# Patient Record
Sex: FEMALE | Race: ASIAN | Employment: OTHER | URBAN - METROPOLITAN AREA
[De-identification: names, ages, dates, MRNs, and addresses within clinical notes are randomized per-mention and may not be internally consistent; named-entity substitution may affect disease eponyms.]

---

## 2017-02-06 PROBLEM — M85.89 OSTEOPENIA OF MULTIPLE SITES: Status: ACTIVE | Noted: 2017-02-06

## 2017-02-06 PROBLEM — R25.2 BILATERAL LEG CRAMPS: Status: ACTIVE | Noted: 2017-02-06

## 2018-06-20 PROBLEM — E78.5 DYSLIPIDEMIA: Status: ACTIVE | Noted: 2018-06-20

## 2019-07-02 PROBLEM — D72.819 LEUKOPENIA, UNSPECIFIED TYPE: Status: ACTIVE | Noted: 2019-07-02

## 2019-07-02 PROBLEM — E78.00 HIGH CHOLESTEROL: Status: ACTIVE | Noted: 2019-07-02

## 2019-07-02 PROBLEM — Z78.0 POST-MENOPAUSE: Status: ACTIVE | Noted: 2019-07-02

## 2020-10-08 PROBLEM — Z78.0 POST-MENOPAUSE: Status: RESOLVED | Noted: 2019-07-02 | Resolved: 2020-10-08

## 2024-02-09 ENCOUNTER — HOSPITAL ENCOUNTER (OUTPATIENT)
Dept: GENERAL RADIOLOGY | Facility: HOSPITAL | Age: 74
Discharge: HOME | End: 2024-02-09
Attending: PHYSICIAN ASSISTANT
Payer: MEDICARE

## 2024-02-09 DIAGNOSIS — R13.10 DYSPHAGIA, UNSPECIFIED TYPE: ICD-10-CM

## 2024-02-09 DIAGNOSIS — R47.9 SPEECH DISTURBANCE, UNSPECIFIED TYPE: ICD-10-CM

## 2024-02-09 PROCEDURE — 74230 X-RAY XM SWLNG FUNCJ C+: CPT | Performed by: PHYSICIAN ASSISTANT

## 2024-02-09 PROCEDURE — 92611 MOTION FLUOROSCOPY/SWALLOW: CPT

## 2024-02-09 NOTE — PROGRESS NOTES
ADULT VIDEOFLUOROSCOPIC SWALLOWING STUDY       ADULT VIDEOFLUOROSCOPIC SWALLOWING STUDY:   Referring Physician: Seth      Radiologist: Dr. Wolfe  Diagnosis: dysphagia    Date of Service: 2/9/2024     PATIENT SUMMARY   Chief Complaint: In September the patient's speech suddenly became slow and slurred.  Neuro and ENT work ups, including MRIs, were negative. Laryngoscopy on 1/17/24 was normal. Currently, dysarthria persists and patient developed dysphagia with liquids in that she could not drink consecutively without coughing/choking.  She coughs with food and/or liquid about once per day.  She reported 8 pound weight loss in the past 4 months.  She denies odynophagia and shortness of breath.  She is scheduled for knee replacement surgery in early March.    Current Diet: regular foods and liquids    Problem List  Active Problems:  Active Problems:    * No active hospital problems. *      Past Medical History  Past Medical History:   Diagnosis Date    Arthritis     Combined forms of age-related cataract of left eye 2/29/2016    Elevated blood pressure reading without diagnosis of hypertension     Hemorrhoid     Leukopenia, unspecified type 7/2/2019    MENOPAUSE 12/06    Osteopenia of multiple sites 2/6/2017    OTHER DISEASES     Vitamin D deficiency 3/25/2010        Imaging results: no recent CXR    1/28/24 MRI maxillofacial:  1. No neck mass or cervical lymphadenopathy. The findings have not changed since the prior MRI of   the brain.     2. Suspect small meningioma in the right parasagittal parietal lobe.   12/5/23 MRI brain:  1. No acute infarct or intracranial mass.   2. Minimal supratentorial white matter chronic microvascular ischemic disease.   3. Small FLAIR hyperintense focus in the left lateral midbrain probably represents an additional   focus of chronic microvascular ischemia.   ASSESSMENT   DYSPHAGIA ASSESSMENT  Test completed in conjunction with Radiologist.   Food/Liquid Types Presented: puree,  solid, mildly thick liquids and thin liquids.    Study Position and View:  Patient was seated upright and viewed laterally and A-P.    Pain Assessment: The patient reports pain at a level of 2/10.    Oral phase:  Bilabial seal was intact with no anterior food or liquid loss.  Impaired lingual control and bolus containment resulted in premature spillage with thin and mildly thick liquids. Disordered A-P propulsion observed with the first presentation of puree which the patient indicated was because of the taste.  The patient propelled partial bolus into the pharynx, brought it back up into the oral cavity and then posterior into the pharynx again to achieve a complete swallow.   Mild oral residue with puree and solids was cleared with spontaneous second swallow.     Pharyngeal phase:  The pharyngeal response triggered at the pyriform sinuses for mildly thick and thin liquids due to premature spillage, and at the tongue base to valleculae for puree and solids.  Premature spillage and delayed epiglottic inversion resulted in laryngeal penetration with thin liquids.  Most of the penetration was ejected with the force of the swallow, however, occasionally trace remained in the laryngeal vestibule without reflexive response.  With consecutive drinks the amount and depth of penetration increased.  Penetration was less consistent with use of straw.  No definite aspiration was observed.  Reduced base of tongue retraction and reduced anterior hyoid excursion resulted in mild vallecular retention with liquids and minimal to mild amounts of vallecular retention with puree and solids.  Laryngeal elevation appeared WNL.    Esophageal phase:   Adequate flow of bolus through upper esophagus     Penetration Aspiration Scale: 3/8.  Material entered the airway, remained above the vocal cords and was not ejected from the airway.    Overall Impression: Mild oropharyngeal dysphagia was characterized by premature spillage to the pyriform  sinuses with thin and mildly thick liquids, laryngeal penetration with thin liquids of which trace amounts remained in the laryngeal vestibule without reflexive response, and mild vallecular retention.  No definite aspiration was observed.  Recommend general diet with thin liquids in single drinks and dysphagia therapy.    FCM category and level: Swallowing, 5  PLAN   Potential: Good    Diet Recommendations:  Solids: Regular  Liquids: Thin    Recommended compensatory strategies:   Sit upright  Small sips  Use chin tuck for liquids    Medication Administration:  Take whole in pureed    Further Follow-up:  Dysphagia therapy is recommended.      GOALS (to be met 6 visits)  The patient will tolerate general diet consistency and thin liquids without overt signs or symptoms of aspiration with 100 % accuracy  The patient/family/caregiver will demonstrate understanding and implementation of aspiration precautions and swallow strategies independently  Sit upright  Small sips  Use chin tuck for liquids   Patient will reduce risk of aspiration by completing dysphagia exercises to 90% accuracy  The patient will participate in oral motor assessment     EDUCATION/INSTRUCTION  Reviewed results and recommendations with patient and family.  Written instructions were provided.  Agreement/Understanding verbalized and all questions answered to their apparent satisfaction.      INTERDISCIPLINARY COMMUNICATION  Reviewed results with Radiologist; agreement verbalized.        Patient/Family was advised of these findings, precautions, recommendations and treatment options and has agreed to actively participate in planning and for this course of care.    Thank you for your referral. Please co-sign or sign and return this letter via fax as soon as possible. If you have any questions, please contact me at [phone].    Little Bermudez MA/[facility]-SLP  Speech Language Pathologist  [facility]  [phone]    Electronically  signed by therapist: Little Bermudez, SLP  Physician's certification required:   Yes  I certify the need for these services furnished under this plan of treatment and while under my care.    X___________________________________________________ Date____________________    Certification From: 2/9/2024  To:5/9/2024

## 2024-02-09 NOTE — PATIENT INSTRUCTIONS
VIDEO SWALLOW STUDY    Diet Recommendations:  Solids: Regular  Liquids: Thin    Recommended compensatory strategies:   Sit upright  Small, single sips  Use chin tuck for liquids    Medication Administration:  Take whole in pureed    Further Follow-up:  Swallowing therapy is recommended.  Please call number below to schedule.    Little Bermudez MA/CCC-SLP  Speech Language Pathologist  [facility]  [phone]

## 2024-02-12 ENCOUNTER — ORDER TRANSCRIPTION (OUTPATIENT)
Dept: PHYSICAL THERAPY | Facility: HOSPITAL | Age: 74
End: 2024-02-12

## 2024-02-12 DIAGNOSIS — R13.10 DYSPHAGIA: Primary | ICD-10-CM

## 2024-02-16 ENCOUNTER — TELEPHONE (OUTPATIENT)
Dept: SPEECH THERAPY | Facility: HOSPITAL | Age: 74
End: 2024-02-16

## 2024-02-20 ENCOUNTER — TELEPHONE (OUTPATIENT)
Dept: SPEECH THERAPY | Facility: HOSPITAL | Age: 74
End: 2024-02-20

## 2024-02-21 ENCOUNTER — TELEPHONE (OUTPATIENT)
Dept: SPEECH THERAPY | Facility: HOSPITAL | Age: 74
End: 2024-02-21

## 2024-02-22 ENCOUNTER — OFFICE VISIT (OUTPATIENT)
Dept: SPEECH THERAPY | Facility: HOSPITAL | Age: 74
End: 2024-02-22
Attending: PHYSICIAN ASSISTANT
Payer: MEDICARE

## 2024-02-22 ENCOUNTER — TELEPHONE (OUTPATIENT)
Dept: SPEECH THERAPY | Facility: HOSPITAL | Age: 74
End: 2024-02-22

## 2024-02-22 DIAGNOSIS — R13.12 OROPHARYNGEAL DYSPHAGIA: Primary | ICD-10-CM

## 2024-02-22 PROCEDURE — 92526 ORAL FUNCTION THERAPY: CPT

## 2024-02-22 NOTE — PROGRESS NOTES
Diagnosis: dysphagia (R13.10), speech disturbance (R47.9)  Authorized # of Visits:  6         Precautions: Covid PPE          Subjective: Pt feels her speech has gotten worse since her VFSS on 2/9/24.  The patient was accompanied by her .  This is patient's first therapy session.     2/9/24 VFSS:  Overall Impression: Mild oropharyngeal dysphagia was characterized by premature spillage to the pyriform sinuses with thin and mildly thick liquids, laryngeal penetration with thin liquids of which trace amounts remained in the laryngeal vestibule without reflexive response, and mild vallecular retention.  No definite aspiration was observed.  Recommend general diet with thin liquids in single drinks and dysphagia therapy.  Objective:      Date: 2/22/2024  Tx#: 1/4 Date:   Tx#: 2/4 Date:   Tx#: 3/4 Date:   Tx#: 4/4   EMST       Effortful swallow       BOT/Lingual control & strength       Mendelsohn       Use of strategies       Tolerance of PO diet           Assessment: Reviewed results and recommendations of VFSS.  Reviewed strategies.  Pt reports she has been using chin tuck with thin liquids.     Oral motor/CN exam and motor speech exam completed.  Results revealed possible involvement of CN V, IX, X and XII. Labial strength, ROM were WNL.  Lingual fasciculations were noted.  Lingual strength was mild-moderately reduced bilaterally.  Range of motion was minimally reduced with lateralization and elevation.  Pt was unable to elevate tongue to upper teeth when mouth was open.  Uvula was symmetrical at rest with mild erythema.  Soft palate retraction was impaired in that patient was only able to sustain for max of one second, even with sustained vowel production.  Resonance was significantly hypernasal with frequent nasal emission audible during spontaneous speech.  This along with impaired articulatory precision, reduced the patient's intelligibility to ~80% in conversation.      Motor Speech assessment  (BDAE):  Non-verbal agility: 7/12  Verbal agility: 13/14, however, articulatory breakdown occurred including imprecise consonants          Goals: (to be met 6 visits)  The patient will tolerate general diet consistency and thin liquids without overt signs or symptoms of aspiration with 100 % accuracy  The patient/family/caregiver will demonstrate understanding and implementation of aspiration precautions and swallow strategies independently  Sit upright  Small sips  Use chin tuck for liquids   Patient will reduce risk of aspiration by completing dysphagia exercises to 90% accuracy  The patient will participate in oral motor assessment Goal met.  The patient will improve articulatory precision to improve speech intelligibility to 95%.  The patient will improve lingual control, rate, coordination and strength to improve non-verbal agility to 90% and verbal agility to 100%.    Plan: Continue therapy per specified goals.  HEP x3/day.    Skilled Services: dysphagia therapy, speech therapy    Charges: [id number]     Total Treatment Time: 45 min    Little Bermudez MA/ALBERTO-SLP  Speech Language Pathologist  [facility]  [phone]

## 2024-03-05 ENCOUNTER — OFFICE VISIT (OUTPATIENT)
Dept: SPEECH THERAPY | Facility: HOSPITAL | Age: 74
End: 2024-03-05
Attending: INTERNAL MEDICINE
Payer: MEDICARE

## 2024-03-05 PROCEDURE — 92526 ORAL FUNCTION THERAPY: CPT

## 2024-03-05 NOTE — PROGRESS NOTES
Diagnosis: dysphagia (R13.10), speech disturbance (R47.9)  Authorized # of Visits:  6         Precautions: Covid PPE          Subjective: In the morning the patient feels there is something stuck in her throat.  She tries to cough and blow her nose to get it out; nothing much comes out.  Pt feels her speech has gotten worse since her VFSS on 2/9/24.  The patient was accompanied by her .  She is having knee replacement surgery on Friday, 3/8/24. She has not yet made an appointment with a neurologist.  She was encouraged to do so asap.     2/9/24 VFSS:  Overall Impression: Mild oropharyngeal dysphagia was characterized by premature spillage to the pyriform sinuses with thin and mildly thick liquids, laryngeal penetration with thin liquids of which trace amounts remained in the laryngeal vestibule without reflexive response, and mild vallecular retention.  No definite aspiration was observed.  Recommend general diet with thin liquids in single drinks and dysphagia therapy.  Objective:      Date: 2/22/2024  Tx#: 1/4 Date: 3/5/24  Tx#: 2/4 Date:   Tx#: 3/4 Date:   Tx#: 4/4   EMST  Provided info on ordering      Effortful swallow  Trained and completed x25-30 with water and saliva.     BOT/Lingual control & strength  Significant improvement in lingual elevation since OM assessment last session as patient has been practicing lingual elevation ex at home.    Trained and completed exercises x20 with frequent rest breaks.     Use of strategies  Reviewed.  Pt using chin tuck independently.     Tolerance of PO diet  Aggressive cough x1         Assessment: Reviewed strategies.  Trained lingual control exercises and effortful swallow.  Since patient has been doing lingual elevation exercises given last session, significant improvement noted.  Pt required frequent rest breaks when doing lingual exercises.  Coordination of circular tongue movement around outside of lips was severely impaired.  Pt required cues to protrude  tongue straight out without it pointing downward, but was able to do so with mirror assist.  Provided info on ordering EMST.        Goals: (to be met 6 visits)  The patient will tolerate general diet consistency and thin liquids without overt signs or symptoms of aspiration with 100 % accuracy  The patient/family/caregiver will demonstrate understanding and implementation of aspiration precautions and swallow strategies independently  Sit upright  Small sips  Use chin tuck for liquids   Patient will reduce risk of aspiration by completing dysphagia exercises to 90% accuracy  The patient will participate in oral motor assessment Goal met.  The patient will improve articulatory precision to improve speech intelligibility to 95%.  The patient will improve lingual control, rate, coordination and strength to improve non-verbal agility to 90% and verbal agility to 100%.    Plan: Continue therapy per specified goals.  HEP x3/day.    Skilled Services: dysphagia therapy, speech therapy    Charges: [id number]     Total Treatment Time: 45 min    Little Bermudez MA/ALBERTO-SLP  Speech Language Pathologist  [facility]  [phone]

## 2024-03-19 ENCOUNTER — OFFICE VISIT (OUTPATIENT)
Dept: SPEECH THERAPY | Facility: HOSPITAL | Age: 74
End: 2024-03-19
Attending: INTERNAL MEDICINE
Payer: MEDICARE

## 2024-03-19 PROCEDURE — 92526 ORAL FUNCTION THERAPY: CPT

## 2024-03-19 NOTE — PROGRESS NOTES
Diagnosis: dysphagia (R13.10), speech disturbance (R47.9)  Authorized # of Visits:  6         Precautions: Covid PPE          Subjective: The patient reports no changes with her swallowing and speech.  The patient was accompanied by her .  She had knee replacement surgery on 3/8/24. She has not yet made an appointment with a neurologist.  She was encouraged to do so asap.     2/9/24 VFSS:  Overall Impression: Mild oropharyngeal dysphagia was characterized by premature spillage to the pyriform sinuses with thin and mildly thick liquids, laryngeal penetration with thin liquids of which trace amounts remained in the laryngeal vestibule without reflexive response, and mild vallecular retention.  No definite aspiration was observed.  Recommend general diet with thin liquids in single drinks and dysphagia therapy.  Objective:      Date: 2/22/2024  Tx#: 1/4 Date: 3/5/24  Tx#: 2/4 Date: 3/19/24  Tx#: 3/4 Date:   Tx#: 4/4   EMST  Provided info on ordering  Calibrated starting point at [id number].  Trained and completed protocol.      Effortful swallow  Trained and completed x25-30 with water and saliva. Reviewed.  Pt is completing at home.  Not completed in session due to time constraints.    BOT/Lingual control & strength  Significant improvement in lingual elevation since OM assessment last session as patient has been practicing lingual elevation ex at home.    Trained and completed exercises x20 with frequent rest breaks. Completed 25 reps of each.  Pt needed frequent rest breaks.      Use of strategies  Reviewed.  Pt using chin tuck independently.     Tolerance of PO diet  Aggressive cough x1 No PO presented to day.  Pt reports little coughing at home.        Assessment: Reviewed lingual exercises.  Pt with slow rate and reduced ROM, although ROM is improving.  Pt now able to protrude tongue straight out which is significantly improved from last session. Calibrated EMST and trained and completed protocol.     Goals:  (to be met 6 visits)  The patient will tolerate general diet consistency and thin liquids without overt signs or symptoms of aspiration with 100 % accuracy  The patient/family/caregiver will demonstrate understanding and implementation of aspiration precautions and swallow strategies independently  Sit upright  Small sips  Use chin tuck for liquids   Patient will reduce risk of aspiration by completing dysphagia exercises to 90% accuracy  The patient will participate in oral motor assessment Goal met.  The patient will improve articulatory precision to improve speech intelligibility to 95%.  The patient will improve lingual control, rate, coordination and strength to improve non-verbal agility to 90% and verbal agility to 100%.    Plan: Continue therapy per specified goals.  HEP x3/day.    Skilled Services: dysphagia therapy, speech therapy    Charges: [id number]     Total Treatment Time: 45 min    Little Bermudez MA/ALBERTO-SLP  Speech Language Pathologist  [facility]  [phone]

## 2024-04-05 RX ORDER — MELOXICAM 15 MG/1
15 TABLET ORAL DAILY
COMMUNITY
Start: 2024-03-05

## 2024-04-05 RX ORDER — ASPIRIN 325 MG
325 TABLET ORAL DAILY
COMMUNITY

## 2024-04-10 ENCOUNTER — OFFICE VISIT (OUTPATIENT)
Dept: SPEECH THERAPY | Facility: HOSPITAL | Age: 74
End: 2024-04-10
Attending: PHYSICIAN ASSISTANT
Payer: MEDICARE

## 2024-04-10 PROCEDURE — 92507 TX SP LANG VOICE COMM INDIV: CPT

## 2024-04-10 NOTE — PROGRESS NOTES
Diagnosis: dysphagia (R13.10), speech disturbance (R47.9)  Authorized # of Visits:  6         Precautions: Covid PPE          Subjective:   Pt saw vascular neurologist:  unremarkable MRI brain scan without brainstem or left hemispheric pathology and normal EMG of the arms and bulbar muscles.   Will check for labs for myasthenia gravis. Consider repeat EMG of bulbar muscles in the future. Continue with speech therapy.   EMG:  Conclusion: abnormal study. 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   There is acute right tibialis anterior nerve irritation with axonal loss which may reflect a prior right peroneal nerve injury versus a RIGHT L5   radiculopathy. No evidence for a myopathy or motor neuron disease.      2/9/24 VFSS:  Overall Impression: Mild oropharyngeal dysphagia was characterized by premature spillage to the pyriform sinuses with thin and mildly thick liquids, laryngeal penetration with thin liquids of which trace amounts remained in the laryngeal vestibule without reflexive response, and mild vallecular retention.  No definite aspiration was observed.  Recommend general diet with thin liquids in single drinks and dysphagia therapy.  Objective:      Date: 2/22/2024  Tx#: 1/4 Date: 3/5/24  Tx#: 2/4 Date: 3/19/24  Tx#: 3/4 Date: 4/10/24  Tx#: 4/4   EMST  Provided info on ordering  Calibrated starting point at [id number].  Trained and completed protocol.   Attempted to increase difficulty, however, pat unable.  Pt requires holding cheeks to achieve lip closure.  Frequently air is not coursing through the device due to poor lip closure.   Effortful swallow  Trained and completed x25-30 with water and saliva. Reviewed.  Pt is completing at home.  Not completed in session due to time constraints. Reviewed, but not completed due to time constraints.   BOT/Lingual control & strength  Significant improvement in lingual elevation since OM assessment last session as patient has been practicing  lingual elevation ex at home.    Trained and completed exercises x20 with frequent rest breaks. Completed 25 reps of each.  Pt needed frequent rest breaks.   Peak levels taken via IOPI:    IOPI lingual strength    MIPA: 11 kPa (max strength anterior tongue)    MIPP: 14 kPa (max strength posterior tongue)    Anterior @ 8 kPa (80%) x15 and 7KPa (70%) x35    IOPI labial strength    MIPL: 14 kPa (max strength left lip)    MIPR: 21 kPa (max strength right lip)           Use of strategies  Reviewed.  Pt using chin tuck independently.     Tolerance of PO diet  Aggressive cough x1 No PO presented to day.  Pt reports little coughing at home.        Assessment: Introduced IOPI measures.  Assessed patient's peak levels which were significantly below normal limits.  Anterior tongue treatment was completed.  Pt unable to achive consistent measures at 80%, so dropped down to 70% and patient was much more consistent.  EMST was completed at same level with occasional inability to egeerate air throught the device.     Goals: (to be met 6 visits)  The patient will tolerate general diet consistency and thin liquids without overt signs or symptoms of aspiration with 100 % accuracy  The patient/family/caregiver will demonstrate understanding and implementation of aspiration precautions and swallow strategies independently  Sit upright  Small sips  Use chin tuck for liquids   Patient will reduce risk of aspiration by completing dysphagia exercises to 90% accuracy  The patient will participate in oral motor assessment Goal met.  The patient will improve articulatory precision to improve speech intelligibility to 95%.  The patient will improve lingual control, rate, coordination and strength to improve non-verbal agility to 90% and verbal agility to 100%.    Plan: Continue therapy per specified goals.  HEP x3/day.    Skilled Services: dysphagia therapy, speech therapy    Charges: [id number]     Total Treatment Time: 45 min    Little Bermudez  MA/CCC-SLP  Speech Language Pathologist  [facility]  [phone]

## 2024-04-11 ENCOUNTER — APPOINTMENT (OUTPATIENT)
Dept: SPEECH THERAPY | Facility: HOSPITAL | Age: 74
End: 2024-04-11
Attending: PHYSICIAN ASSISTANT
Payer: MEDICARE

## 2024-04-11 PROCEDURE — 92526 ORAL FUNCTION THERAPY: CPT

## 2024-04-11 NOTE — PROGRESS NOTES
Diagnosis: dysphagia (R13.10), speech disturbance (R47.9)  Authorized # of Visits:  6         Precautions: Covid PPE          Subjective: Pt reports difficulty with balance.  She had knee replacement surgery 3/8/24.  She is currently enrolled in physical therapy at another clinic.    Pt saw vascular neurologist:  unremarkable MRI brain scan without brainstem or left hemispheric pathology and normal EMG of the arms and bulbar muscles.   Will check for labs for myasthenia gravis. Consider repeat EMG of bulbar muscles in the future. Continue with speech therapy.   EMG:  Conclusion: abnormal study. 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   There is acute right tibialis anterior nerve irritation with axonal loss which may reflect a prior right peroneal nerve injury versus a RIGHT L5   radiculopathy. No evidence for a myopathy or motor neuron disease.      2/9/24 VFSS:  Overall Impression: Mild oropharyngeal dysphagia was characterized by premature spillage to the pyriform sinuses with thin and mildly thick liquids, laryngeal penetration with thin liquids of which trace amounts remained in the laryngeal vestibule without reflexive response, and mild vallecular retention.  No definite aspiration was observed.  Recommend general diet with thin liquids in single drinks and dysphagia therapy.  Objective:      Date: 2/22/2024  Tx#: 1/4 Date: 3/5/24  Tx#: 2/4 Date: 3/19/24  Tx#: 3/4 Date: 4/10/24  Tx#: 4/4   EMST  Provided info on ordering  Calibrated starting point at [id number].  Trained and completed protocol.   Attempted to increase difficulty, however, pat unable.  Pt requires holding cheeks to achieve lip closure.  Frequently air is not coursing through the device due to poor lip closure.   Effortful swallow  Trained and completed x25-30 with water and saliva. Reviewed.  Pt is completing at home.  Not completed in session due to time constraints. Reviewed, but not completed due to time constraints.    BOT/Lingual control & strength  Significant improvement in lingual elevation since OM assessment last session as patient has been practicing lingual elevation ex at home.    Trained and completed exercises x20 with frequent rest breaks. Completed 25 reps of each.  Pt needed frequent rest breaks.   Peak levels taken via IOPI:    IOPI lingual strength    MIPA: 11 kPa (max strength anterior tongue)    MIPP: 14 kPa (max strength posterior tongue)    Anterior @ 8 kPa (80%) x15 and 7KPa (70%) x35    IOPI labial strength    MIPL: 14 kPa (max strength left lip)    MIPR: 21 kPa (max strength right lip)           Use of strategies  Reviewed.  Pt using chin tuck independently.     Tolerance of PO diet  Aggressive cough x1 No PO presented to day.  Pt reports little coughing at home.         4/11/24  Tx #: 5   EMST    Effortful swallow X30 with water and saliva   BOT/Lingual control & strength IOPI lingual:  MIPA:  anterior tongue   11 kPa x20 reps  9 kPa x20 reps  10 kPa x10 reps    IOPI labial:  Left: 11 kPa x30, unable to achieve accuracy so reduced to 10 kPa x20 reps    Right: 15 kPa x50         Use of strategies Reviewed.  Pt consistent with use of chin tuck.   Tolerance of PO diet No clinical signs of aspiration         Assessment:  Initiated treatment of posterior tongue and right and left lip strengthening via IOPI.  Pt unable to achieve consistent pressures at 80% level, so dropped down to 70% of maximum.  Reviewed effortful swallow and chin tuck.  Pt able to do consistently.  No clinical signs of aspiration noted.   Speech intelligibility/dysarthria was worse today.  Pt was asked to repeat several times throughout the session.    Goals: (to be met 6 visits)  The patient will tolerate general diet consistency and thin liquids without overt signs or symptoms of aspiration with 100 % accuracy  The patient/family/caregiver will demonstrate understanding and implementation of aspiration precautions and swallow strategies  independently  Sit upright  Small sips  Use chin tuck for liquids   Patient will reduce risk of aspiration by completing dysphagia exercises to 90% accuracy  The patient will participate in oral motor assessment Goal met.  The patient will improve articulatory precision to improve speech intelligibility to 95%.  The patient will improve lingual control, rate, coordination and strength to improve non-verbal agility to 90% and verbal agility to 100%.    Plan: Continue therapy per specified goals.  HEP x3/day.    Skilled Services: dysphagia therapy, speech therapy    Charges: [id number]     Total Treatment Time: 45 min    Little Bermudez MA/ALBERTO-SLP  Speech Language Pathologist  [facility]  [phone]

## 2024-04-16 ENCOUNTER — ANESTHESIA EVENT (OUTPATIENT)
Dept: SURGERY | Facility: HOSPITAL | Age: 74
End: 2024-04-16
Payer: MEDICARE

## 2024-04-16 ENCOUNTER — HOSPITAL ENCOUNTER (OUTPATIENT)
Facility: HOSPITAL | Age: 74
Setting detail: HOSPITAL OUTPATIENT SURGERY
Discharge: HOME | End: 2024-04-16
Attending: OBSTETRICS & GYNECOLOGY | Admitting: OBSTETRICS & GYNECOLOGY
Payer: MEDICARE

## 2024-04-16 ENCOUNTER — ANESTHESIA (OUTPATIENT)
Dept: SURGERY | Facility: HOSPITAL | Age: 74
End: 2024-04-16
Payer: MEDICARE

## 2024-04-16 VITALS
RESPIRATION RATE: 16 BRPM | DIASTOLIC BLOOD PRESSURE: 71 MMHG | BODY MASS INDEX: 26.06 KG/M2 | HEART RATE: 66 BPM | SYSTOLIC BLOOD PRESSURE: 168 MMHG | HEIGHT: 61 IN | WEIGHT: 138 LBS | TEMPERATURE: 97 F | OXYGEN SATURATION: 100 %

## 2024-04-16 PROCEDURE — 0UB98ZX EXCISION OF UTERUS, VIA NATURAL OR ARTIFICIAL OPENING ENDOSCOPIC, DIAGNOSTIC: ICD-10-PCS | Performed by: OBSTETRICS & GYNECOLOGY

## 2024-04-16 PROCEDURE — 88305 TISSUE EXAM BY PATHOLOGIST: CPT | Performed by: OBSTETRICS & GYNECOLOGY

## 2024-04-16 RX ORDER — HYDROCODONE BITARTRATE AND ACETAMINOPHEN 5; 325 MG/1; MG/1
1 TABLET ORAL ONCE AS NEEDED
OUTPATIENT
Start: 2024-04-16 | End: 2024-04-16

## 2024-04-16 RX ORDER — ONDANSETRON 2 MG/ML
4 INJECTION INTRAMUSCULAR; INTRAVENOUS EVERY 6 HOURS PRN
OUTPATIENT
Start: 2024-04-16

## 2024-04-16 RX ORDER — SODIUM CHLORIDE, SODIUM LACTATE, POTASSIUM CHLORIDE, CALCIUM CHLORIDE 600; 310; 30; 20 MG/100ML; MG/100ML; MG/100ML; MG/100ML
INJECTION, SOLUTION INTRAVENOUS CONTINUOUS
OUTPATIENT
Start: 2024-04-16

## 2024-04-16 RX ORDER — ACETAMINOPHEN 500 MG
1000 TABLET ORAL ONCE
Status: DISCONTINUED | OUTPATIENT
Start: 2024-04-16 | End: 2024-04-16 | Stop reason: HOSPADM

## 2024-04-16 RX ORDER — HYDROCODONE BITARTRATE AND ACETAMINOPHEN 5; 325 MG/1; MG/1
2 TABLET ORAL ONCE AS NEEDED
OUTPATIENT
Start: 2024-04-16 | End: 2024-04-16

## 2024-04-16 RX ORDER — SODIUM CHLORIDE, SODIUM LACTATE, POTASSIUM CHLORIDE, CALCIUM CHLORIDE 600; 310; 30; 20 MG/100ML; MG/100ML; MG/100ML; MG/100ML
INJECTION, SOLUTION INTRAVENOUS CONTINUOUS
Status: DISCONTINUED | OUTPATIENT
Start: 2024-04-16 | End: 2024-04-16

## 2024-04-16 RX ORDER — HYDROMORPHONE HYDROCHLORIDE 1 MG/ML
0.4 INJECTION, SOLUTION INTRAMUSCULAR; INTRAVENOUS; SUBCUTANEOUS EVERY 5 MIN PRN
OUTPATIENT
Start: 2024-04-16 | End: 2024-04-17

## 2024-04-16 RX ORDER — HYDROMORPHONE HYDROCHLORIDE 1 MG/ML
0.6 INJECTION, SOLUTION INTRAMUSCULAR; INTRAVENOUS; SUBCUTANEOUS EVERY 5 MIN PRN
OUTPATIENT
Start: 2024-04-16 | End: 2024-04-17

## 2024-04-16 RX ORDER — ACETAMINOPHEN 500 MG
1000 TABLET ORAL ONCE AS NEEDED
OUTPATIENT
Start: 2024-04-16 | End: 2024-04-16

## 2024-04-16 RX ORDER — HYDROMORPHONE HYDROCHLORIDE 1 MG/ML
0.2 INJECTION, SOLUTION INTRAMUSCULAR; INTRAVENOUS; SUBCUTANEOUS EVERY 5 MIN PRN
OUTPATIENT
Start: 2024-04-16 | End: 2024-04-17

## 2024-04-16 RX ORDER — NALOXONE HYDROCHLORIDE 0.4 MG/ML
0.08 INJECTION, SOLUTION INTRAMUSCULAR; INTRAVENOUS; SUBCUTANEOUS AS NEEDED
OUTPATIENT
Start: 2024-04-16 | End: 2024-04-17

## 2024-04-16 RX ORDER — LIDOCAINE HYDROCHLORIDE 10 MG/ML
INJECTION, SOLUTION EPIDURAL; INFILTRATION; INTRACAUDAL; PERINEURAL AS NEEDED
Status: DISCONTINUED | OUTPATIENT
Start: 2024-04-16 | End: 2024-04-16 | Stop reason: SURG

## 2024-04-16 RX ADMIN — LIDOCAINE HYDROCHLORIDE 50 MG: 10 INJECTION, SOLUTION EPIDURAL; INFILTRATION; INTRACAUDAL; PERINEURAL at 16:45:00

## 2024-04-16 RX ADMIN — SODIUM CHLORIDE, SODIUM LACTATE, POTASSIUM CHLORIDE, CALCIUM CHLORIDE: 600; 310; 30; 20 INJECTION, SOLUTION INTRAVENOUS at 16:41:00

## 2024-04-16 NOTE — H&P
[facility]   part of [facility]    History & Physical    Emilia Gaviria Patient Status:  Hospital Outpatient Surgery    11/10/1950 MRN [id number]   Location [facility] SURGERY Attending Elizabeth Alcaraz MD   Hosp Day # 0 PCP Sheeba Pack MD     Date of Admission:  2024    SUBJECTIVE:    Reason for Admission:  Abnormal pelvic ultrasound with endometrial thickening    History of Present Illness:  Patient is a(n) 73 year old female GP/LMP  with abnromal endometrial thickening noted on imaging after knee surgery.  Inadequate office EMB.  Presents for HSC sampling    Medications:  Medications Prior to Admission   Medication Sig Dispense Refill Last Dose    Meloxicam 15 MG Oral Tab Take 1 tablet (15 mg total) by mouth daily.   3/31/2024    Coenzyme Q10 (COQ10 OR) Take 1 capsule by mouth daily.   More than a month    aspirin 325 MG Oral Tab Take 1 tablet (325 mg total) by mouth daily.   3/31/2024    Cholecalciferol (VITAMIN D) 2000 units Oral Cap Take 1 capsule (2,000 Units total) by mouth.   More than a month    magnesium 250 MG Oral Tab Take 1 tablet (250 mg total) by mouth daily.   More than a month    Flaxseed, Linseed, (FLAX SEED OIL OR) Take  by mouth.   More than a month       Allergies:  No Known Allergies     Past Medical History:  Past Medical History:    Arthritis    Combined forms of age-related cataract of left eye    Elevated blood pressure reading without diagnosis of hypertension    Hemorrhoid    Leukopenia, unspecified type    MENOPAUSE    Osteopenia of multiple sites    OTHER DISEASES    PONV (postoperative nausea and vomiting)    Problems with swallowing    Vitamin D deficiency       Past Surgical History:  Past Surgical History:   Procedure Laterality Date    Colonoscopy,diagnostic  2015    1 cm polyp; ASC    Colonoscopy,remv lesn,snare N/A 2015    Procedure: COLONOSCOPY, POSSIBLE BIOPSY, POSSIBLE POLYPECTOMY 38637;  Surgeon: Darrell Dawkins MD;  Location:  [facility]    Lig div&stripping short saphenous vein  2001    Ligation hemorrhoid w/us  2012    Procedure: TRANSANAL HEMORRHOIDAL DEARTERIALIZATION (THD);  Surgeon: Doug Lomax MD;  Location: [facility]    Other surgical history  2015    Cystoscopy - Dr. Randle     Patient documented not to have experienced any of the following events N/A 2015    Procedure: COLONOSCOPY, POSSIBLE BIOPSY, POSSIBLE POLYPECTOMY 46255;  Surgeon: Darrell Dawkins MD;  Location: [facility]    Patient withough preoperative order for iv antibiotic surgical site infection prophylaxis. N/A 2015    Procedure: COLONOSCOPY, POSSIBLE BIOPSY, POSSIBLE POLYPECTOMY 30713;  Surgeon: Darrell Dawkins MD;  Location: [facility]    Repair of rectum,prolapse mucosa  2012    Procedure: EXAM UNDER ANESTHESIA, RECTAL;  Surgeon: Doug Lomax MD;  Location: [facility]    Total knee replacement      Tubal ligation         Past OB History:  OB History    Para Term  AB Living   1 1           SAB IAB Ectopic Multiple Live Births                  # Outcome Date GA Lbr Jamal/2nd Weight Sex Type Anes PTL Lv   1 Para                Past GYN History:   Menopausal.  No HRT.  No bleeding    Social History:  .  Social History     Tobacco Use    Smoking status: Never    Smokeless tobacco: Never   Substance Use Topics    Alcohol use: No     Alcohol/week: 0.0 standard drinks of alcohol        Review of Systems:  normal menses, no abnormal bleeding, pelvic pain or discharge and no breast pain or new or enlarging lumps on self exam    OBJECTIVE:    Temp:  [98.4 °F (36.9 °C)] 98.4 °F (36.9 °C)  Pulse:  [59] 59  Resp:  [158] 158  BP: (164)/(74) 164/74  SpO2:  [97 %] 97 %  No intake or output data in the 24 hours ending 24 1456    Physical Exam:  GENERAL: well developed, well nourished, in no apparent distress  SKIN: no rashes, no suspicious  lesions  HEENT: normal  NECK: supple; no thyroidmegaly, no adenopathy  BREAST: no masses, no nipple discharge, no skin retraction, no axillary adenopathy  LUNGS: clear to auscultation  CARDIOVASCULAR: normal S1, S2, RRR  ABDOMEN: Soft, non distended; non tender, no masses  GYNE/: External Genitalia: Normal                      Vagina: normal                      Uterus: atneverted, mobile, non tender, small                     Cervix: multip, no lesions                     Adnexa: non tender, no masses  EXTREMITIES:  non tender without edema  NEUROLOGIC: intact  PSYCHIATRIC: alert and oriented x 3; affect appropriate      Diagnostics:   DATE OF SERVICE: 02.28.2024   US PELVIS (TRANSABDOMINAL AND TRANSVAGINAL) (CPT=76856/64886)     CLINICAL INDICATION: Disorder of the endometrium, follow-up.     COMPARISON: No relevant prior studies currently available at this institution for direct comparison,   per Epic notes.     TECHNIQUE: Both transabdominal and endovaginal ultrasound examination of the pelvis was performed.     CYCLE: Postmenopausal     FINDINGS:     UTERUS:   The uterus is anteverted and measures 7.9 x 5.1 x 3.2 cm. Total volume is 67.4 mL.   The myometrium is homogeneous.   The endometrial echo complex measures up to 17.0 mm in thickness, which is abnormally thickened with   numerous scattered cystic appearing foci; differential diagnosis includes but is not limited to   endometrial hyperplasia, polyps, and carcinoma. Appropriate workup strongly advised.     The cervix has nabothian cysts..     RIGHT OVARY:   The right ovary is not visualized at this time.     LEFT OVARY:   The left ovary is not visualized at this time.     No significant free fluid in the posterior cul-de-sac.     Data Review:        ASSESSMENT/PLAN:    Patient Active Problem List   Diagnosis    Vitamin D deficiency    Osteopenia of multiple sites    High cholesterol    Leukopenia, unspecified type       We are going to proceed with a D&C  hysteroscopy possible operative hysteroscopy. The risks for surgery including infection, bleeding, and perforation were discussed.  If perforation were to occur, she is aware of the additional risk of injury to surrounding bowel, bladder or blood vessels, and the possible need for laparoscopy, laparotomy, re-operation or transfusion.  She is also aware that some of these injuries may not be identified until a later time and the patient may need to have additional surgery. We discussed the risk of anesthesia as well as the risk for DVT. We discussed the risks for scarring and incompetent cervix. The patient is also aware that insufficient tissue may be obtained. If a resectoscope is performed, she is aware of the risks of fluid imbalance, cerebral edema and seizure. She is also aware that not all of the scheduled procedure may be able to be performed and further surgery may be needed at a later time. The patient notes understanding of these risks and wishes to proceed.    Elizabeth Alcaraz MD  4/16/2024  2:56 PM

## 2024-04-16 NOTE — DISCHARGE INSTRUCTIONS
Discharge Instructions  HYSTEROSCOPY OR DILITATION AND CURETTAGE  DR WILHELM      After surgery you can expect some light vaginal bleeding.  This may last from 1-7 days and is not a concern unless it is heavier than a menstrual period.     Generally, tampons should be avoided but mini-pads or panti-shields may be used until the post-operative spotting has ceased.    If you had an endometrial ablation, you can expect a watery, yellow discharge for 1-3 weeks that will gradually diminish.    There may be some mild cramping in the lower abdomen.  You may take Acetaminophen (Tylenol) or Ibuprofen (Motrin or Advil) if needed.  Usually a prescription pain medication will not be needed.      You should rest the day of surgery and may resume normal activities the next day.  You should not have intercourse until bleeding has stopped.    If you should experience any of the following symptoms, please notify your doctor’s office as soon as possible:  Temperature over 101 degrees, vaginal bleeding heavier than a moderate day of your period, severe abdominal cramping, chest pain, shortness of breath, or any other problem that you feel is significant.  Otherwise follow up with your doctor as indicated.

## 2024-04-16 NOTE — BRIEF OP NOTE
Pre-Operative Diagnosis: ABNORMAL PELVIC ULTRASOUND, THICKENED ENDOMETRIUM     Post-Operative Diagnosis: ABNORMAL PELVIC ULTRASOUND, THICKENED ENDOMETRIUM      Procedure Performed:   HYSTEROSCOPY, SAMPLING POLYPECTOMY    Surgeons and Role:     * Elizabeth Alcaraz MD - Primary    Assistant(s):        Surgical Findings: large endometrial polyp     Specimen: endometrial polypectomy and curettings     Estimated Blood Loss: Blood Output: 1 mL (4/16/2024  5:11 PM)      Dictation Number:       Elizabeth Alcaraz MD  4/16/2024  5:17 PM

## 2024-04-16 NOTE — ANESTHESIA PREPROCEDURE EVALUATION
PRE-OP EVALUATION    Patient Name: Emilia Gaviria    Admit Diagnosis: ABNORMAL PELVIC ULTRASOUND, THICKENED ENDOMETRIUM    Pre-op Diagnosis: ABNORMAL PELVIC ULTRASOUND, THICKENED ENDOMETRIUM    HYSTEROSCOPY, SAMPLING POLYPECTOMY    Anesthesia Procedure: HYSTEROSCOPY, SAMPLING POLYPECTOMY    Surgeons and Role:     * Elizabeth Alcaraz MD - Primary    Pre-op vitals reviewed.  Temp: 98.4 °F (36.9 °C)  Pulse: 59  Resp: 158  BP: 164/74  SpO2: 97 %  Body mass index is 26.07 kg/m².    Current medications reviewed.  Hospital Medications:   lactated ringers infusion   Intravenous Continuous       Outpatient Medications:     Medications Prior to Admission   Medication Sig Dispense Refill Last Dose    Meloxicam 15 MG Oral Tab Take 1 tablet (15 mg total) by mouth daily.   3/31/2024    Coenzyme Q10 (COQ10 OR) Take 1 capsule by mouth daily.   More than a month    aspirin 325 MG Oral Tab Take 1 tablet (325 mg total) by mouth daily.   3/31/2024    Cholecalciferol (VITAMIN D) 2000 units Oral Cap Take 1 capsule (2,000 Units total) by mouth.   More than a month    magnesium 250 MG Oral Tab Take 1 tablet (250 mg total) by mouth daily.   More than a month    Flaxseed, Linseed, (FLAX SEED OIL OR) Take  by mouth.   More than a month       Allergies: Patient has no known allergies.      Anesthesia Evaluation    Patient summary reviewed.    Anesthetic Complications  (+) history of anesthetic complications  History of: PONV       GI/Hepatic/Renal    Negative GI/hepatic/renal ROS.                             Cardiovascular    Negative cardiovascular ROS.                                                   Endo/Other               (+) anemia                   Pulmonary    Negative pulmonary ROS.                       Neuro/Psych    Negative neuro/psych ROS.                                  Past Surgical History:   Procedure Laterality Date    Colonoscopy,diagnostic  04/06/2015    1 cm polyp; ASC    Colonoscopy,thalia major,snare N/A 04/06/2015     Procedure: COLONOSCOPY, POSSIBLE BIOPSY, POSSIBLE POLYPECTOMY 03676;  Surgeon: Darrell Dawkins MD;  Location: [facility]    Lig div&stripping short saphenous vein  01/01/2001    Ligation hemorrhoid w/us  12/11/2012    Procedure: TRANSANAL HEMORRHOIDAL DEARTERIALIZATION (THD);  Surgeon: Doug Lomax MD;  Location: [facility]    Other surgical history  08/19/2015    Cystoscopy - Dr. Randle     Patient documented not to have experienced any of the following events N/A 04/06/2015    Procedure: COLONOSCOPY, POSSIBLE BIOPSY, POSSIBLE POLYPECTOMY 20093;  Surgeon: Darrell Dawkins MD;  Location: [facility]    Patient withough preoperative order for iv antibiotic surgical site infection prophylaxis. N/A 04/06/2015    Procedure: COLONOSCOPY, POSSIBLE BIOPSY, POSSIBLE POLYPECTOMY 16638;  Surgeon: Darrell Dawkins MD;  Location: [facility]    Repair of rectum,prolapse mucosa  12/11/2012    Procedure: EXAM UNDER ANESTHESIA, RECTAL;  Surgeon: Doug Lomax MD;  Location: [facility]    Total knee replacement      Tubal ligation       Social History     Socioeconomic History    Marital status:      Spouse name:  5/2010   Occupational History    Occupation: retired [employer]   Tobacco Use    Smoking status: Never    Smokeless tobacco: Never   Vaping Use    Vaping status: Never Used   Substance and Sexual Activity    Alcohol use: No     Alcohol/week: 0.0 standard drinks of alcohol    Drug use: Never    Sexual activity: Yes     Partners: Male   Other Topics Concern    Exercise Yes    Seat Belt Yes     History   Drug Use Unknown     Available pre-op labs reviewed.                ASA: 2   Plan: MAC  NPO status verified and     Post-procedure pain management plan discussed with surgeon and patient.    Comment: MAC discussed in detail, as well as, possible conversion to GETA.  Risk of sore throat, cough, dental trauma, PONV discussed.  All  questions answered    Plan/risks discussed with: patient                Present on Admission:  **None**

## 2024-04-16 NOTE — ANESTHESIA POSTPROCEDURE EVALUATION
[facility]    Emilia Gaviria Patient Status:  Hospital Outpatient Surgery   Age/Gender 73 year old female MRN [id number]   Location [facility] SURGERY Attending Elizabeth Alcaraz MD   Hosp Day # 0 PCP Sheeba Pack MD       Anesthesia Post-op Note    HYSTEROSCOPY, SAMPLING POLYPECTOMY    Procedure Summary       Date: 04/16/24 Room / Location:  MAIN OR 08 / [facility] MAIN OR    Anesthesia Start: 1641 Anesthesia Stop: 1728    Procedure: HYSTEROSCOPY, SAMPLING POLYPECTOMY (Vagina ) Diagnosis: (ABNORMAL PELVIC ULTRASOUND, THICKENED ENDOMETRIUM)    Surgeons: Elizabeth Alcaraz MD Anesthesiologist: Royce Day MD    Anesthesia Type: MAC ASA Status: 2            Anesthesia Type: MAC    Vitals Value Taken Time   /42 04/16/24 1728   Temp 99 04/16/24 1728   Pulse 85 04/16/24 1728   Resp 19 04/16/24 1728   SpO2 99 04/16/24 1728       Patient Location: [facility] Surgery    Anesthesia Type: MAC    Airway Patency: patent    Postop Pain Control: adequate    Mental Status: preanesthetic baseline    Nausea/Vomiting: none    Cardiopulmonary/Hydration status: stable euvolemic    Complications: no apparent anesthesia related complications    Postop vital signs: stable    Dental Exam: Unchanged from Preop    Patient to be discharged home when criteria met.

## 2024-04-16 NOTE — OPERATIVE REPORT
[facility]    PATIENT'S NAME: DENIS RODRIGUEZ   ATTENDING PHYSICIAN: Elizabeth Alcaraz M.D.   OPERATING PHYSICIAN: Elizabeth Alcaraz M.D.   PATIENT ACCOUNT#:   [id number]    LOCATION:  [facility] 5 EDWP 10  MEDICAL RECORD #:   [id number]       YOB: 1950  ADMISSION DATE:       04/16/2024      OPERATION DATE:  04/16/2024    OPERATIVE REPORT      PREOPERATIVE DIAGNOSIS:  Abnormal endometrium on pelvic ultrasound.  POSTOPERATIVE DIAGNOSIS:  Abnormal endometrium on pelvic ultrasound, with endometrial polyps.    PROCEDURE:      ASSISTANT:  None.    ANESTHESIA:  MAC.    FLUIDS:  Crystalloid.      URINE OUTPUT:  Not measured.      ESTIMATED BLOOD LOSS:  1 mL.      OPERATIVE TECHNIQUE:  The patient was taken to the operating room with an IV running.  She was administered anesthesia without incident.  Once her anesthesia was adequate, she was prepped and draped in the normal sterile fashion.  Once her anesthesia was adequate, using a vaginoscopy technique, the external cervical os was visualized.  The scope was introduced into the external cervical os and introduced into the endometrial cavity under direct visualization.  There was a large polyp filling the endometrial cavity with additional polypoid changes along the endometrium anteriorly, especially on the right.  Using the soft tissue polyp blade, the polyp was resected and the polypoid changes anteriorly were resected.  This may have been a an old fibroid.  Once the surface polypoid tissue was resected, the interior appeared more dense.  Once the procedure was completed, the scope was removed from the uterus.  The patient was awakened from anesthesia and brought to the recovery room in excellent condition.     Dictated By Elizabeth Alcaraz M.D.  d: 04/16/2024 17:16:54  t: 04/16/2024 18:40:17  [facility] [id number]  AS/

## 2024-04-18 ENCOUNTER — OFFICE VISIT (OUTPATIENT)
Dept: SPEECH THERAPY | Facility: HOSPITAL | Age: 74
End: 2024-04-18
Attending: PHYSICIAN ASSISTANT
Payer: MEDICARE

## 2024-04-18 PROCEDURE — 92526 ORAL FUNCTION THERAPY: CPT

## 2024-04-18 NOTE — PROGRESS NOTES
Diagnosis: dysphagia (R13.10), speech disturbance (R47.9)  Authorized # of Visits:  6         Precautions: Covid PPE          Subjective: All blood work and MRI were negative.  Auto immune blood work was normal.      Vascular neurologist's impression:  unremarkable MRI brain scan without brainstem or left hemispheric pathology and normal EMG of the arms and bulbar muscles. Will check for labs for myasthenia gravis. Consider repeat EMG of bulbar muscles in the future. Continue with speech therapy.   EMG:  Conclusion: abnormal study. 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 There is acute right tibialis anterior nerve irritation with axonal loss which may reflect a prior right peroneal nerve injury versus a RIGHT L5 radiculopathy. No evidence for a myopathy or motor neuron disease.      2/9/24 VFSS:  Overall Impression: Mild oropharyngeal dysphagia was characterized by premature spillage to the pyriform sinuses with thin and mildly thick liquids, laryngeal penetration with thin liquids of which trace amounts remained in the laryngeal vestibule without reflexive response, and mild vallecular retention.  No definite aspiration was observed.  Recommend general diet with thin liquids in single drinks and dysphagia therapy.  Objective:      Date: 2/22/2024  Tx#: 1/4 Date: 3/5/24  Tx#: 2/4 Date: 3/19/24  Tx#: 3/4 Date: 4/10/24  Tx#: 4/4   EMST  Provided info on ordering  Calibrated starting point at [id number].  Trained and completed protocol.   Attempted to increase difficulty, however, pat unable.  Pt requires holding cheeks to achieve lip closure.  Frequently air is not coursing through the device due to poor lip closure.   Effortful swallow  Trained and completed x25-30 with water and saliva. Reviewed.  Pt is completing at home.  Not completed in session due to time constraints. Reviewed, but not completed due to time constraints.   BOT/Lingual control & strength  Significant improvement in lingual  elevation since OM assessment last session as patient has been practicing lingual elevation ex at home.    Trained and completed exercises x20 with frequent rest breaks. Completed 25 reps of each.  Pt needed frequent rest breaks.   Peak levels taken via IOPI:    IOPI lingual strength    MIPA: 11 kPa (max strength anterior tongue)    MIPP: 14 kPa (max strength posterior tongue)    posterior @ 8 kPa (80%) x15 and 7KPa (70%) x35    IOPI labial strength    MIPL: 14 kPa (max strength left lip)    MIPR: 21 kPa (max strength right lip)           Use of strategies  Reviewed.  Pt using chin tuck independently.     Tolerance of PO diet  Aggressive cough x1 No PO presented to day.  Pt reports little coughing at home.         4/11/24  Tx #: 5 4/18/24  6   EMST  Increased to 37.5 cmH2O  Completed protocol at new level.  Pt nose using nose clip and hand on cheeks to assist labial closure.  Pt still with some air loss around white mouth piece.  Not able to course air through with every blow, ~90%   Effortful swallow X30 with water and saliva Pt is doing at home.  Reviewed.   BOT/Lingual control & strength IOPI lingual:  MIPA:  anterior tongue   11 kPa x20 reps  9 kPa x20 reps  10 kPa x10 reps    IOPI labial:  Left: 11 kPa x30, unable to achieve accuracy so reduced to 10 kPa x20 reps    Right: 15 kPa x50       OPI lingual:  anterior tongue   9 kPa x25 reps  10 kPa x25 reps    Posterior tongue   8 kPa (80%) 2 sets x25     IOPI labial:  Left: 11 kPa x25    Right: 15 kPa x25    Less reps due to time constraints.    Improved lingual elevation   Use of strategies Reviewed.  Pt consistent with use of chin tuck.    Tolerance of PO diet No clinical signs of aspiration          Assessment:  Improvement noted with [id number] with increase in difficulty indicating an increase expiratory muscle strength.  Improvement also noted with IOPI labial and lingual strength as indicated above.  Speech remains mod-severely dysarthric with frequent requests  for repetition required in order to be understood.  Face to face communication is essential for communication.   Reviewed effortful swallow and chin tuck.  Pt able to do consistently.  No PO presented today.      Goals: (to be met 6 visits)  The patient will tolerate general diet consistency and thin liquids without overt signs or symptoms of aspiration with 100 % accuracy  The patient/family/caregiver will demonstrate understanding and implementation of aspiration precautions and swallow strategies independently  Sit upright  Small sips  Use chin tuck for liquids   Patient will reduce risk of aspiration by completing dysphagia exercises to 90% accuracy  The patient will participate in oral motor assessment Goal met.  The patient will improve articulatory precision to improve speech intelligibility to 95%.  The patient will improve lingual control, rate, coordination and strength to improve non-verbal agility to 90% and verbal agility to 100%.    Plan: Continue therapy per specified goals.  HEP x3/day.    Skilled Services: dysphagia therapy, speech therapy    Charges: [id number]     Total Treatment Time: 45 min    Little Bermudez MA/ALBERTO-SLP  Speech Language Pathologist  Replaced by [facility]  [phone]

## 2024-04-25 ENCOUNTER — OFFICE VISIT (OUTPATIENT)
Dept: SPEECH THERAPY | Facility: HOSPITAL | Age: 74
End: 2024-04-25
Attending: PHYSICIAN ASSISTANT
Payer: MEDICARE

## 2024-04-25 PROCEDURE — 92526 ORAL FUNCTION THERAPY: CPT

## 2024-04-25 NOTE — PROGRESS NOTES
Diagnosis: dysphagia (R13.10), speech disturbance (R47.9)  Authorized # of Visits:  6         Precautions: Covid PPE          Subjective: Pt reports her speech is getting worse.  Even close friends and family have difficulty understanding her.  Pt uses a hard swallow every time she swallows food or liquid.  She is coughing less than 1-2 times per day.  Pt reports she has an appointment with a new neurologist in June.  She reported weakness in her right foot and balance issues.  She is seeing an ortho PT since her knee surgery.  Pt may benefit from neuro PT.    Vascular neurologist's impression:  unremarkable MRI brain scan without brainstem or left hemispheric pathology and normal EMG of the arms and bulbar muscles. Will check for labs for myasthenia gravis. Consider repeat EMG of bulbar muscles in the future. Continue with speech therapy.   EMG:  Conclusion: abnormal study. 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 There is acute right tibialis anterior nerve irritation with axonal loss which may reflect a prior right peroneal nerve injury versus a RIGHT L5 radiculopathy. No evidence for a myopathy or motor neuron disease.      2/9/24 VFSS:  Overall Impression: Mild oropharyngeal dysphagia was characterized by premature spillage to the pyriform sinuses with thin and mildly thick liquids, laryngeal penetration with thin liquids of which trace amounts remained in the laryngeal vestibule without reflexive response, and mild vallecular retention.  No definite aspiration was observed.  Recommend general diet with thin liquids in single drinks and dysphagia therapy.  Objective:      Date: 2/22/2024  Tx#: 1/4 Date: 3/5/24  Tx#: 2/4 Date: 3/19/24  Tx#: 3/4 Date: 4/10/24  Tx#: 4/4   EMST  Provided info on ordering  Calibrated starting point at [id number].  Trained and completed protocol.   Attempted to increase difficulty, however, pat unable.  Pt requires holding cheeks to achieve lip closure.   Frequently air is not coursing through the device due to poor lip closure.   Effortful swallow  Trained and completed x25-30 with water and saliva. Reviewed.  Pt is completing at home.  Not completed in session due to time constraints. Reviewed, but not completed due to time constraints.   BOT/Lingual control & strength  Significant improvement in lingual elevation since OM assessment last session as patient has been practicing lingual elevation ex at home.    Trained and completed exercises x20 with frequent rest breaks. Completed 25 reps of each.  Pt needed frequent rest breaks.   Peak levels taken via IOPI:    IOPI lingual strength    MIPA: 11 kPa (max strength anterior tongue)    MIPP: 14 kPa (max strength posterior tongue)    posterior @ 8 kPa (80%) x15 and 7KPa (70%) x35    IOPI labial strength    MIPL: 14 kPa (max strength left lip)    MIPR: 21 kPa (max strength right lip)           Use of strategies  Reviewed.  Pt using chin tuck independently.     Tolerance of PO diet  Aggressive cough x1 No PO presented to day.  Pt reports little coughing at home.         4/11/24  Tx #: 5 4/18/24  6 4/25/24  7   EMST  Increased to 37.5 cmH2O  Completed protocol at new level.  Pt nose using nose clip and hand on cheeks to assist labial closure.  Pt still with some air loss around white mouth piece.  Not able to course air through with every blow, ~90% Increased to [id number].  Pt able to complete entire protocol at this more difficult setting.  Less air leakage around device.     Effortful swallow X30 with water and saliva Pt is doing at home.  Reviewed. Effortful swallows completed between sets of EMST.   BOT/Lingual control & strength IOPI lingual:  MIPA:  anterior tongue   11 kPa x20 reps  9 kPa x20 reps  10 kPa x10 reps    IOPI labial:  Left: 11 kPa x30, unable to achieve accuracy so reduced to 10 kPa x20 reps    Right: 15 kPa x50       IOPI lingual:  anterior tongue   9 kPa x25 reps  10 kPa x25 reps    Posterior tongue    8 kPa (80%) 2 sets x25     IOPI labial:  Left: 11 kPa x25    Right: 15 kPa x25    Less reps due to time constraints.    Improved lingual elevation IOPI lingual:  Anterior: 10 kPa x25  Posterior: 11 kPa x25        IOPI labial:  Left: 12 kPa x25  Right: 16 kPa x25     Use of strategies Reviewed.  Pt consistent with use of chin tuck.     Tolerance of PO diet No clinical signs of aspiration  No PO given this session.   Speech strategies   Briefly discussed.   Intelligibility   Unknown context blinded 25%  Unknown context unblinded 67%         Assessment:  Fasciculations of the tongue were again appreciated.  Articulatory accuracy and intelligibility continues to decline.  Speech remains mod-severely dysarthric with frequent requests for repetition required in order to be understood.  Face to face communication is essential for communication which improved from 25% blinded to 67% when looking at the patient.  Both non-verbal and verbal agility have declined since initial speech evaluation on 2/22/24.  However, improvement was noted with expiratory muscle strength as evidenced by increase in [id number] level.  Improvement also noted with labial and lingual strength as pt was able to increase targets with all IOPI lingual and labial measures.  Reviewed effortful swallow and chin tuck.  Pt able to do consistently.  No PO presented today.  Left message with Dr. Pack's office regarding progressive dysphagia and dysarthria and foot weakness and balance disturbance.         2/22/24 4/25/24  Non-verbal agility:  7/12 6/12  Verbal agility:   13/14 9/14        Goals: (to be met 6 visits)  The patient will tolerate general diet consistency and thin liquids without overt signs or symptoms of aspiration with 100 % accuracy  The patient/family/caregiver will demonstrate understanding and implementation of aspiration precautions and swallow strategies independently  Sit upright  Small sips  Use chin tuck for liquids   Patient will  reduce risk of aspiration by completing dysphagia exercises to 90% accuracy  The patient will participate in oral motor assessment Goal met.  The patient will improve articulatory precision to improve speech intelligibility to 95%.  The patient will improve lingual control, rate, coordination and strength to improve non-verbal agility to 90% and verbal agility to 100%.    Plan: Continue therapy per specified goals.  HEP x3/day.    Skilled Services: dysphagia therapy, speech therapy    Charges: [id number]     Total Treatment Time: 45 min    Little Bermudez MA/ALBERTO-SLP  Speech Language Pathologist  [facility]  [phone]

## 2024-05-03 ENCOUNTER — OFFICE VISIT (OUTPATIENT)
Dept: SPEECH THERAPY | Facility: HOSPITAL | Age: 74
End: 2024-05-03
Attending: PHYSICIAN ASSISTANT
Payer: MEDICARE

## 2024-05-03 PROCEDURE — 92526 ORAL FUNCTION THERAPY: CPT

## 2024-05-03 NOTE — PROGRESS NOTES
Diagnosis: dysphagia (R13.10), speech disturbance (R47.9)  Authorized # of Visits:  6         Precautions: Covid PPE          Subjective: After last session, the patient's PCP was contacted and a message was left with her office regarding patient's  \"progressive dysphagia and dysarthria and foot weakness and balance disturbance.\"  Pt reported that she was contacted and asked if she was in the hospital.  From encounter notes it is apparent that the phone call that I (this SLP) placed with PCP was misunderstood.  They documented that a nurse from the hospital and not a SLP from the clinic called.  They then incorrectly deduced that the patient had been admitted to the hospital.  The patient was contacted and straightened out the miscommunication.     She feels her breathing is a little worse.  She has not been using the [id number], because it is too difficult.  She is still coughing when drinking and had one very aggressive and prolonged episode with water.  She feels like her saliva is more sticky.    Pt reports she has an appointment with a new neurologist in June.  She reported weakness in her right foot and balance issues.  She is seeing an ortho PT since her knee surgery.  Pt may benefit from neuro PT.    Vascular neurologist's impression:  unremarkable MRI brain scan without brainstem or left hemispheric pathology and normal EMG of the arms and bulbar muscles. Will check for labs for myasthenia gravis. Consider repeat EMG of bulbar muscles in the future. Continue with speech therapy.   EMG:  Conclusion: abnormal study. 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 There is acute right tibialis anterior nerve irritation with axonal loss which may reflect a prior right peroneal nerve injury versus a RIGHT L5 radiculopathy. No evidence for a myopathy or motor neuron disease.      2/9/24 VFSS:  Overall Impression: Mild oropharyngeal dysphagia was characterized by premature spillage to the pyriform  sinuses with thin and mildly thick liquids, laryngeal penetration with thin liquids of which trace amounts remained in the laryngeal vestibule without reflexive response, and mild vallecular retention.  No definite aspiration was observed.  Recommend general diet with thin liquids in single drinks and dysphagia therapy.  Objective:      Date: 2/22/2024  Tx#: 1/4 Date: 3/5/24  Tx#: 2/4 Date: 3/19/24  Tx#: 3/4 Date: 4/10/24  Tx#: 4/4   EMST  Provided info on ordering  Calibrated starting point at [id number].  Trained and completed protocol.   Attempted to increase difficulty, however, pat unable.  Pt requires holding cheeks to achieve lip closure.  Frequently air is not coursing through the device due to poor lip closure.   Effortful swallow  Trained and completed x25-30 with water and saliva. Reviewed.  Pt is completing at home.  Not completed in session due to time constraints. Reviewed, but not completed due to time constraints.   BOT/Lingual control & strength  Significant improvement in lingual elevation since OM assessment last session as patient has been practicing lingual elevation ex at home.    Trained and completed exercises x20 with frequent rest breaks. Completed 25 reps of each.  Pt needed frequent rest breaks.   Peak levels taken via IOPI:    IOPI lingual strength    MIPA: 11 kPa (max strength anterior tongue)    MIPP: 14 kPa (max strength posterior tongue)    posterior @ 8 kPa (80%) x15 and 7KPa (70%) x35    IOPI labial strength    MIPL: 14 kPa (max strength left lip)    MIPR: 21 kPa (max strength right lip)           Use of strategies  Reviewed.  Pt using chin tuck independently.     Tolerance of PO diet  Aggressive cough x1 No PO presented to day.  Pt reports little coughing at home.         4/11/24  Tx #: 5 4/18/24  6 4/25/24  7 5/3/24  8   EMST  Increased to 37.5 cmH2O  Completed protocol at new level.  Pt nose using nose clip and hand on cheeks to assist labial closure.  Pt still with some air loss  around white mouth piece.  Not able to course air through with every blow, ~90% Increased to [id number].  Pt able to complete entire protocol at this more difficult setting.  Less air leakage around device.   Pt not able to advance difficulty level.  Remained at [id number], but able to complete entire protocol without as much leakage from around the mouth piece.  The patient still benefits from hand against her cheeks to aid in labial closure around the mouthpiece.     Effortful swallow X30 with water and saliva Pt is doing at home.  Reviewed. Effortful swallows completed between sets of EMST. X30 during session  with water and saliva.    Every  time pt drinks at home she uses effortful swallow.   BOT/Lingual control & strength IOPI lingual:  MIPA:  anterior tongue   11 kPa x20 reps  9 kPa x20 reps  10 kPa x10 reps    IOPI labial:  Left: 11 kPa x30, unable to achieve accuracy so reduced to 10 kPa x20 reps    Right: 15 kPa x50       IOPI lingual:  anterior tongue   9 kPa x25 reps  10 kPa x25 reps    Posterior tongue   8 kPa (80%) 2 sets x25     IOPI labial:  Left: 11 kPa x25    Right: 15 kPa x25    Less reps due to time constraints.    Improved lingual elevation IOPI lingual:  Anterior: 10 kPa x25  Posterior: 11 kPa x25        IOPI labial:  Left: 12 kPa x25  Right: 16 kPa x25   IOPI lingual  Anterior: 10kPa x35  Posterior: 11kPa x35      IOPI  Left: 13 kPa x25  Right: 17kPa x25   Use of strategies Reviewed.  Pt consistent with use of chin tuck.   Pt is independent with chin tuck and small sips.   Tolerance of PO diet No clinical signs of aspiration  No PO given this session. No clinical signs of aspiration.   Speech strategies   Briefly discussed.    Intelligibility   Unknown context blinded 25%  Unknown context unblinded 67%          Assessment:  Articulatory accuracy and intelligibility continues to be significantly impaired.  Speech remains mod-severely dysarthric with frequent requests for repetition required in order  to be understood.  Pt has not been using EMST at home because it was too difficult.  The device was inadvertently turned so difficulty level was increased beyond patient's ability.  Device was returned to previous level and during the session she was able to complete the protocol at same level as last session.  Improvement also noted with labial and lingual strength as pt was able to increase targets with IOPI labial measures.  She was not able to increase lingual target pressure, but increased reps.  Encouraged consistent EMST use, effortful swallows and IOPI bulb exercises for HEP.  As patient is nearing the end of Medicare coverage, it is recommended skipping next week's session and adding it on two week after her next session.  Therefore her next follow up session will be in two weeks.         2/22/24 4/25/24  Non-verbal agility:  7/12 6/12  Verbal agility:   13/14 9/14        Goals: (to be met 6 visits)  The patient will tolerate general diet consistency and thin liquids without overt signs or symptoms of aspiration with 100 % accuracy  The patient/family/caregiver will demonstrate understanding and implementation of aspiration precautions and swallow strategies independently  Sit upright  Small sips  Use chin tuck for liquids   Patient will reduce risk of aspiration by completing dysphagia exercises to 90% accuracy  The patient will participate in oral motor assessment Goal met.  The patient will improve articulatory precision to improve speech intelligibility to 95%.  The patient will improve lingual control, rate, coordination and strength to improve non-verbal agility to 90% and verbal agility to 100%.    Plan: Continue therapy per specified goals.  HEP x3/day.    Skilled Services: dysphagia therapy, speech therapy    Charges: [id number]     Total Treatment Time: 45 min    Little Bermudez MA/[facility]-SLP  Speech Language Pathologist  [facility]  [phone]

## 2024-05-09 ENCOUNTER — APPOINTMENT (OUTPATIENT)
Dept: SPEECH THERAPY | Facility: HOSPITAL | Age: 74
End: 2024-05-09
Attending: PHYSICIAN ASSISTANT
Payer: MEDICARE

## 2024-05-16 ENCOUNTER — OFFICE VISIT (OUTPATIENT)
Dept: SPEECH THERAPY | Facility: HOSPITAL | Age: 74
End: 2024-05-16
Attending: PHYSICIAN ASSISTANT
Payer: MEDICARE

## 2024-05-16 PROCEDURE — 92526 ORAL FUNCTION THERAPY: CPT

## 2024-05-16 NOTE — PROGRESS NOTES
Diagnosis: dysphagia (R13.10), speech disturbance (R47.9)  Authorized # of Visits:  6         Precautions: Covid PPE          Subjective: Eating is easier.   She has been using the [id number] this week.  Pt has an appointment with a new neurologist in June.  She reported weakness in her right foot and balance issues.  She has two more sessions with ortho PT since her knee surgery.  Pt may benefit from neuro PT.    Vascular neurologist's impression:  unremarkable MRI brain scan without brainstem or left hemispheric pathology and normal EMG of the arms and bulbar muscles. Will check for labs for myasthenia gravis. Consider repeat EMG of bulbar muscles in the future. Continue with speech therapy.   EMG:  Conclusion: abnormal study. 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 There is acute right tibialis anterior nerve irritation with axonal loss which may reflect a prior right peroneal nerve injury versus a RIGHT L5 radiculopathy. No evidence for a myopathy or motor neuron disease.      2/9/24 VFSS:  Overall Impression: Mild oropharyngeal dysphagia was characterized by premature spillage to the pyriform sinuses with thin and mildly thick liquids, laryngeal penetration with thin liquids of which trace amounts remained in the laryngeal vestibule without reflexive response, and mild vallecular retention.  No definite aspiration was observed.  Recommend general diet with thin liquids in single drinks and dysphagia therapy.  Objective:      Date: 2/22/2024  Tx#: 1/4 Date: 3/5/24  Tx#: 2/4 Date: 3/19/24  Tx#: 3/4 Date: 4/10/24  Tx#: 4/4   EMST  Provided info on ordering  Calibrated starting point at [id number].  Trained and completed protocol.   Attempted to increase difficulty, however, pat unable.  Pt requires holding cheeks to achieve lip closure.  Frequently air is not coursing through the device due to poor lip closure.   Effortful swallow  Trained and completed x25-30 with water and saliva. Reviewed.   Pt is completing at home.  Not completed in session due to time constraints. Reviewed, but not completed due to time constraints.   BOT/Lingual control & strength  Significant improvement in lingual elevation since OM assessment last session as patient has been practicing lingual elevation ex at home.    Trained and completed exercises x20 with frequent rest breaks. Completed 25 reps of each.  Pt needed frequent rest breaks.   Peak levels taken via IOPI:    IOPI lingual strength    MIPA: 11 kPa (max strength anterior tongue)    MIPP: 14 kPa (max strength posterior tongue)    posterior @ 8 kPa (80%) x15 and 7KPa (70%) x35    IOPI labial strength    MIPL: 14 kPa (max strength left lip)    MIPR: 21 kPa (max strength right lip)           Use of strategies  Reviewed.  Pt using chin tuck independently.     Tolerance of PO diet  Aggressive cough x1 No PO presented to day.  Pt reports little coughing at home.         4/11/24  Tx #: 5 4/18/24  6 4/25/24  7 5/3/24  8 5/16/24  9   EMST  Increased to 37.5 cmH2O  Completed protocol at new level.  Pt nose using nose clip and hand on cheeks to assist labial closure.  Pt still with some air loss around white mouth piece.  Not able to course air through with every blow, ~90% Increased to [id number].  Pt able to complete entire protocol at this more difficult setting.  Less air leakage around device.   Pt not able to advance difficulty level.  Remained at [id number], but able to complete entire protocol without as much leakage from around the mouth piece.  The patient still benefits from hand against her cheeks to aid in labial closure around the mouthpiece.   Pt moved it to a more difficult level this week.  Re-calibrated.  Pt able to go up to 108.75 cmH2O, a significant increase.  Pt still benefits from tactile pressure on cheeks to aid in lip seal.    Effortful swallow X30 with water and saliva Pt is doing at home.  Reviewed. Effortful swallows completed between sets of EMST. X30 during  session  with water and saliva.    Every  time pt drinks at home she uses effortful swallow. Pt is doing \"a lot\" at home.    Not completed during session due to time constraints.   BOT/Lingual control & strength IOPI lingual:  MIPA:  anterior tongue   11 kPa x20 reps  9 kPa x20 reps  10 kPa x10 reps    IOPI labial:  Left: 11 kPa x30, unable to achieve accuracy so reduced to 10 kPa x20 reps    Right: 15 kPa x50       IOPI lingual:  anterior tongue   9 kPa x25 reps  10 kPa x25 reps    Posterior tongue   8 kPa (80%) 2 sets x25     IOPI labial:  Left: 11 kPa x25    Right: 15 kPa x25    Less reps due to time constraints.    Improved lingual elevation IOPI lingual:  Anterior: 10 kPa x25  Posterior: 11 kPa x25        IOPI labial:  Left: 12 kPa x25  Right: 16 kPa x25   IOPI lingual  Anterior: 10kPa x35  Posterior: 11kPa x35      IOPI  Left: 13 kPa x25  Right: 17kPa x25 IOPI lingual  Anterior: 11kPa x50  Anterior: 12kPa x10  Posterior: 12kPa x50    IOPI labial  Left: 13 kPa x25  Right: 17kPa x25   Use of strategies Reviewed.  Pt consistent with use of chin tuck.   Pt is independent with chin tuck and small sips. Pt is independent with swallowing strategies.     Tolerance of PO diet No clinical signs of aspiration  No PO given this session. No clinical signs of aspiration. No PO presented today.   Speech strategies   Briefly discussed.  Cues required for speech strategies.   Intelligibility   Unknown context blinded 25%  Unknown context unblinded 67%           Assessment:  Lingual strength continues to improve with each session.  Pt was able to increase targets and number of reps with IOPI lingual measures.  Labial measures remained stable.  Expiratory muscle strength is improving as evidenced by increase in difficulty level of EMST. 5 sets of 5 were completed. Discussed speech strategies including slow, loud and over-articulation.  Pt able to model the strategies in phrases.       2/22/24 4/25/24  Non-verbal agility:   7/12 6/12  Verbal agility:   13/14 9/14        Goals: (to be met 6 visits)  The patient will tolerate general diet consistency and thin liquids without overt signs or symptoms of aspiration with 100 % accuracy  The patient/family/caregiver will demonstrate understanding and implementation of aspiration precautions and swallow strategies independently  Sit upright  Small sips  Use chin tuck for liquids   Patient will reduce risk of aspiration by completing dysphagia exercises to 90% accuracy  The patient will participate in oral motor assessment Goal met.  The patient will improve articulatory precision to improve speech intelligibility to 95%.  The patient will improve lingual control, rate, coordination and strength to improve non-verbal agility to 90% and verbal agility to 100%.    Plan: Continue therapy per specified goals.  HEP x3/day.    Skilled Services: dysphagia therapy, speech therapy    Charges: [id number]     Total Treatment Time: 45 min    Little Bermudez MA/ALBERTO-SLP  Speech Language Pathologist  [facility]  [phone]

## 2024-06-11 ENCOUNTER — APPOINTMENT (OUTPATIENT)
Dept: SPEECH THERAPY | Facility: HOSPITAL | Age: 74
End: 2024-06-11
Attending: PHYSICIAN ASSISTANT
Payer: MEDICARE

## 2024-06-13 ENCOUNTER — APPOINTMENT (OUTPATIENT)
Dept: SPEECH THERAPY | Facility: HOSPITAL | Age: 74
End: 2024-06-13
Attending: PHYSICIAN ASSISTANT
Payer: MEDICARE

## 2024-06-13 ENCOUNTER — TELEPHONE (OUTPATIENT)
Dept: SPEECH THERAPY | Facility: HOSPITAL | Age: 74
End: 2024-06-13

## 2024-06-13 PROCEDURE — 92526 ORAL FUNCTION THERAPY: CPT

## 2024-06-13 NOTE — PROGRESS NOTES
FINAL THERAPY SESSION AND DISCHARGE SUMMARY    Diagnosis: dysphagia (R13.10), speech disturbance (R47.9)  Authorized # of Visits:  6  recommended       Precautions: Covid PPE          Subjective: Pt intermittently coughs when she lays down and she then has trouble breathing. She feels like she has thick mucous in her throat. Her breathing sometimes improves after coughing or blowing her nose.  I she drinks too fast, she chokes.  Her coughing is getting worse. She avoids foods that contain a lot of liquid such as watermelon.  Pt is unable to use EMST now because she doesn't have enough strength.  She is doing the effortful swallows.  Tongue presses are getting more difficult.  Pt cancelled appointment with the general neurologist and went back to previous neurologist.  He suggested seeing a neurologist who specializes in neuromuscular diseases.   She went back to neurologist who recommends repeating EMG.    Pt was seen for a total of 10 therapy sessions.       2/9/24 VFSS:  Overall Impression: Mild oropharyngeal dysphagia was characterized by premature spillage to the pyriform sinuses with thin and mildly thick liquids, laryngeal penetration with thin liquids of which trace amounts remained in the laryngeal vestibule without reflexive response, and mild vallecular retention.  No definite aspiration was observed.  Recommend general diet with thin liquids in single drinks and dysphagia therapy.  Objective:      Date: 2/22/2024  Tx#: 1/4 Date: 3/5/24  Tx#: 2/4 Date: 3/19/24  Tx#: 3/4 Date: 4/10/24  Tx#: 4/4   EMST  Provided info on ordering  Calibrated starting point at [id number].  Trained and completed protocol.   Attempted to increase difficulty, however, pat unable.  Pt requires holding cheeks to achieve lip closure.  Frequently air is not coursing through the device due to poor lip closure.   Effortful swallow  Trained and completed x25-30 with water and saliva. Reviewed.  Pt is completing at home.  Not completed in  session due to time constraints. Reviewed, but not completed due to time constraints.   BOT/Lingual control & strength  Significant improvement in lingual elevation since OM assessment last session as patient has been practicing lingual elevation ex at home.    Trained and completed exercises x20 with frequent rest breaks. Completed 25 reps of each.  Pt needed frequent rest breaks.   Peak levels taken via IOPI:    IOPI lingual strength    MIPA: 11 kPa (max strength anterior tongue)    MIPP: 14 kPa (max strength posterior tongue)    posterior @ 8 kPa (80%) x15 and 7KPa (70%) x35    IOPI labial strength    MIPL: 14 kPa (max strength left lip)    MIPR: 21 kPa (max strength right lip)           Use of strategies  Reviewed.  Pt using chin tuck independently.     Tolerance of PO diet  Aggressive cough x1 No PO presented to day.  Pt reports little coughing at home.         4/11/24  Tx #: 5 4/18/24  6 4/25/24  7 5/3/24  8 5/16/24  9   EMST  Increased to 37.5 cmH2O  Completed protocol at new level.  Pt nose using nose clip and hand on cheeks to assist labial closure.  Pt still with some air loss around white mouth piece.  Not able to course air through with every blow, ~90% Increased to [id number].  Pt able to complete entire protocol at this more difficult setting.  Less air leakage around device.   Pt not able to advance difficulty level.  Remained at [id number], but able to complete entire protocol without as much leakage from around the mouth piece.  The patient still benefits from hand against her cheeks to aid in labial closure around the mouthpiece.   Pt moved it to a more difficult level this week.  Re-calibrated.  Pt able to go up to 108.75 cmH2O, a significant increase.  Pt still benefits from tactile pressure on cheeks to aid in lip seal.    Effortful swallow X30 with water and saliva Pt is doing at home.  Reviewed. Effortful swallows completed between sets of EMST. X30 during session  with water and saliva.     Every  time pt drinks at home she uses effortful swallow. Pt is doing \"a lot\" at home.    Not completed during session due to time constraints.   BOT/Lingual control & strength IOPI lingual:  MIPA:  anterior tongue   11 kPa x20 reps  9 kPa x20 reps  10 kPa x10 reps    IOPI labial:  Left: 11 kPa x30, unable to achieve accuracy so reduced to 10 kPa x20 reps    Right: 15 kPa x50       IOPI lingual:  anterior tongue   9 kPa x25 reps  10 kPa x25 reps    Posterior tongue   8 kPa (80%) 2 sets x25     IOPI labial:  Left: 11 kPa x25    Right: 15 kPa x25    Less reps due to time constraints.    Improved lingual elevation IOPI lingual:  Anterior: 10 kPa x25  Posterior: 11 kPa x25        IOPI labial:  Left: 12 kPa x25  Right: 16 kPa x25   IOPI lingual  Anterior: 10kPa x35  Posterior: 11kPa x35      IOPI  Left: 13 kPa x25  Right: 17kPa x25 IOPI lingual  Anterior: 11kPa x50  Anterior: 12kPa x10  Posterior: 12kPa x50    IOPI labial  Left: 13 kPa x25  Right: 17kPa x25   Use of strategies Reviewed.  Pt consistent with use of chin tuck.   Pt is independent with chin tuck and small sips. Pt is independent with swallowing strategies.     Tolerance of PO diet No clinical signs of aspiration  No PO given this session. No clinical signs of aspiration. No PO presented today.   Speech strategies   Briefly discussed.  Cues required for speech strategies.   Intelligibility   Unknown context blinded 25%  Unknown context unblinded 67%        6/13/24  10   EMST Re-calibrated as patient reported inability to complete reps.  75 max, but unable to complete multiple reps.  Reduced to 67.5.    Completed only 1 set of 5 due to time constraints.   Effortful swallow Completed with thickened liquids.   BOT/Lingual control & strength IOPI lingual strength re-assessment    MIPA: 11 kPa (max strength anterior tongue) same as initial assessment    MIPP: 12 kPa (max strength posterior tongue) reduced from initial assessment of 14kPa        IOPI labial  strength    MIPL: 14 kPa (max strength left lip)  same as initial assessment    MIPR: 21 kPa (max strength right lip)  same as initial assessment   Use of strategies Pt using chin tuck. Introduced thickened liquids.  Pt felt it was easier  No clinical signs of aspiration.     Tolerance of PO diet No clinical signs of aspiration with thick liquids.   Speech strategies    Intelligibility 50% when face to face.        Assessment:  Reassessment of lingual and labial strength via IOPI revealed maintenance of strength from initial assessment, except for posterior tongue which diminished in strength.  During previous two sessions, however, Pt was able to increase targets and number of reps with IOPI lingual measures.   Expiratory muscle strength has been inconsistent, however, improvement has increased from initial assessment of 30 cmH2O to 75 cmH2O today.  Pt was unable to maintain the endurance required to complete the series of blows, however, and calibration was reduced to 67.5 cmH2O.  Pt reports coughing more frequently with liquids, therefore mildly thick liquids were introduced today.  Pt was able to drink 4 ounces without clinical signs of aspiration.  Speech intelligibility continues to slowly decline.  Pt with fasciculations of the tongue and loss of muscle bulk of the palms of hands. The patient does not have a neurological diagnosis, however, her disease process has been progressive and her dysarthria and dysphagia continue to worsen.  Pt was counseled to make appointment with a neurologist specializing in progressive neuromuscular diseases asap.      Plan: Discharge from therapy, but continue with  HEP x3/day.  Repeat VFSS is recommended to assess current swallow physiology, assure tolerance of mildly thick liquids and determine appropriate compensatory strategies.           2/22/24 4/25/24  Non-verbal agility:  7/12 6/12  Verbal agility:   13/14 9/14        Goals: (to be met 6 visits)  The patient will  tolerate general diet consistency and thin liquids without overt signs or symptoms of aspiration with 100 % accuracy.  Goal not met.  Downgraded this session to mildly thick liquids.  The patient/family/caregiver will demonstrate understanding and implementation of aspiration precautions and swallow strategies independently  Sit upright  Small sips  Use chin tuck for liquids Goal met.  Pt is using strategies consistently.  Patient will reduce risk of aspiration by completing dysphagia exercises to 90% accuracy.  Goal partially met.  Pt is completing exercises consistently but with minimal improvement.  The patient will participate in oral motor assessment Goal met.  The patient will improve articulatory precision to improve speech intelligibility to 95%. Goal not met.  The patient will improve lingual control, rate, coordination and strength to improve non-verbal agility to 90% and verbal agility to 100%. Goal not met.    Skilled Services: dysphagia therapy, speech therapy    Charges: [id number]     Total Treatment Time: 60 min    Little Bermudez MA/ALBERTO-SLP  Speech Language Pathologist  [facility]  [phone]

## 2024-06-13 NOTE — PATIENT INSTRUCTIONS
LIQUID CONSISTENCIES    Liquids are generally classified into three groups: honey, nectar and thin consistency liquids.  Thin liquids can be the most difficult consistency for people with dysphagia or swallowing problems to control, especially if there is muscle weakness and/or paralysis in the mouth or throat.   Thickening these liquids can assist with control and optimize the intake of fluids for hydration.    Below is a list of liquids of different consistencies to use as a guideline in choosing appropriate dinks.  This list is not inclusive and all of the liquids listed can be altered to achieve the appropriate consistency by using a commercial thickener.    Honey Consistency Liquids:  any liquid that is the same thickness as honey, yogurt, custard, heavy gravy, pudding and blenderized cream soups such as cream of potato are often appropriate.  Any liquid in the nectar and thin consistency group can be altered to honey consistency using a commercial thickener.    Nectar Thick Liquids:  Apricot nectar, tomato juice, vegetable juice, buttermilk, eggnog, and most 1.5-2 arben/ml oral supplements such as Boost plus and Ensure plus.  Ask your speech pathologist if you may have milkshakes, yogurt shakes, ice cream, malts and gelatin.  Use a commercial thickener with thin liquids to achieve the appropriate thickness.    Thin Liquids:  Ice chips, water, coffee, tea, milk, juice, lemonade, soda, hot chocolate, alcohol, fruit ices, broth, clear liquid soups, and most 1kcal/ml oral supplements such as Boost or Ensure.    Some thick liquids may melt into thin liquids in the mouth or when allowed to stand at room temperature.  It may be necessary to stabilize these liquids with a commercial thickener.         Powdered Thickeners and Pre-Thickened Products:     1. Resource Thicken Up Clear       : [employer]   www.th[employer]realSociable[employer]illtime   1-494.739.5142    2. Thick and Easy Instant Food Thickener   Thick and Easy  pre-mixed beverages   : [employer]   www.[employer]   1-148.833.2866    3. Thick It/ Thick It 2   : [employer]   www.[employer]   1-478.960.8750    Most pharmacies, especially [facility] and [facility] should have one brand of thickener in stock.  [employer] and [employer] have free shipping for all of their products and will ship the same day.

## 2024-06-21 ENCOUNTER — TELEPHONE (OUTPATIENT)
Dept: SPEECH THERAPY | Facility: HOSPITAL | Age: 74
End: 2024-06-21

## 2024-06-21 NOTE — TELEPHONE ENCOUNTER
Called Dr. Pack's office and requested order for repeat VFSS.  Called and LM for daughter that I will let her know when order is placed so she can schedule the VFSS. BV

## 2024-07-16 ENCOUNTER — HOSPITAL ENCOUNTER (OUTPATIENT)
Dept: GENERAL RADIOLOGY | Facility: HOSPITAL | Age: 74
Discharge: HOME | End: 2024-07-16
Attending: PHYSICIAN ASSISTANT
Payer: MEDICARE

## 2024-07-16 DIAGNOSIS — R49.0 HOARSENESS: ICD-10-CM

## 2024-07-16 DIAGNOSIS — K21.9 GASTROESOPHAGEAL REFLUX DISEASE: ICD-10-CM

## 2024-07-16 DIAGNOSIS — R13.12 DYSPHAGIA, OROPHARYNGEAL: ICD-10-CM

## 2024-07-16 DIAGNOSIS — R47.9 SPEECH DISTURBANCE: ICD-10-CM

## 2024-07-16 DIAGNOSIS — R13.12 OROPHARYNGEAL DYSPHAGIA: Primary | ICD-10-CM

## 2024-07-16 DIAGNOSIS — R13.10 DYSPHAGIA: ICD-10-CM

## 2024-07-16 DIAGNOSIS — R09.A2 GLOBUS SENSATION: ICD-10-CM

## 2024-07-16 DIAGNOSIS — R22.0 TONGUE SWELLING: ICD-10-CM

## 2024-07-16 PROCEDURE — 74230 X-RAY XM SWLNG FUNCJ C+: CPT | Performed by: PHYSICIAN ASSISTANT

## 2024-07-16 PROCEDURE — 92611 MOTION FLUOROSCOPY/SWALLOW: CPT

## 2024-07-16 NOTE — PATIENT INSTRUCTIONS
VIDEO SWALLOW STUDY    Diet Recommendations:  Solids: Regular to soft, easy to chew foods  Liquids: Thin    Recommended compensatory strategies:   Sit upright  Small sips  No straw    Medication Administration:  Take whole in pureed    Further Follow-up:  No follow up at this time.  Continue tongue exercises.    Remember Slow, Loud, Over-articulate, Take frequent pauses for breaths.    Little Bermudez MA/[facility]-SLP  Speech Language Pathologist  [facility]  [phone]

## 2024-07-16 NOTE — PROGRESS NOTES
ADULT VIDEOFLUOROSCOPIC SWALLOWING STUDY       ADULT VIDEOFLUOROSCOPIC SWALLOWING STUDY:   Referring Physician: Seth      Radiologist: Dr. Wolfe  Diagnosis: dysphagia    Date of Service: 7/16/2024     PATIENT SUMMARY   Chief Complaint: The patient is well known to me from previous VFSS and dysphagia therapy.  The patient has had progressive dysphagia and progressive dysarthria for the last year.  At her last sessions of therapy, liquids were downgraded to mildly thick.  Pt does not use thickener for her liquids consistently, but uses it for most beverages.  The patient's dysarthria has progressed so much so that her daughter had to repeat what the patient was saying so that I could understand.  The daughter also has difficulty understanding her, especially over the phone.  Pt does not yet have a neurological diagnosis, however, she has an appointment with 2 neurologists in August.  Pt is here today to assess current swallow physiology and rule out aspiration.       2/9/23 VFSS:  Mild oropharyngeal dysphagia was characterized by premature spillage to the pyriform sinuses with thin and mildly thick liquids, laryngeal penetration with thin liquids of which trace amounts remained in the laryngeal vestibule without reflexive response, and mild vallecular retention.  No definite aspiration was observed.  Recommend general diet with thin liquids in single drinks and dysphagia therapy.    Current Diet: general diet with  primarily mildly thick and occasional thin liquids       Problem List  Active Problems:  Active Problems:    * No active hospital problems. *      Past Medical History  Past Medical History:    Arthritis    Combined forms of age-related cataract of left eye    Elevated blood pressure reading without diagnosis of hypertension    Hemorrhoid    Leukopenia, unspecified type    MENOPAUSE    Osteopenia of multiple sites    OTHER DISEASES    PONV (postoperative nausea and vomiting)    Problems with  swallowing    Vitamin D deficiency        Imaging results: no recent CXR    ASSESSMENT   DYSPHAGIA ASSESSMENT  Test completed in conjunction with Radiologist.   Food/Liquid Types Presented: puree, solid, nectar/mildly thick liquid, and thin liquids.    Study Position and View:  Patient was seated upright and viewed laterally and A-P.    Pain Assessment: The patient reported pain at a level of 0/10 prior to study.  During study, when drinking mildly thick liquids, the patient had a sudden pain during the swallow of 6/10.    Oral phase:  Bilabial seal was grossly intact with no anterior food or liquid loss.  Pt with anterior bolus hold for all consistencies.  Pt demonstrated difficulty initiating the swallow with small inefficient anterior lingual movements.  It took 4-12.5 seconds to propel liquid boluses into the pharynx.  She was unable to propel puree into the pharynx and had to spit it out.  Mastication time was mild-moderately increased but complete.  Mild oral residue of solids was cleared with spontaneous second swallow.    Pharyngeal phase:  The pharyngeal response triggered at the tongue base to valleculae for mildly thick liquids and at the tongue base for solids.  Premature spillage and slightly delayed swallow resulted in transient laryngeal penetration x1 by cup and x1 by straw.  No material remained in the airway and no aspiration was observed.  Reduced base of tongue retraction resulted in mild vallecular retention with thin and mildly thick liquids and minimal with solid.  The patient swallowed twice spontaneously for most boluses which reduced the retention to minimal-trace amounts.    Esophageal phase:   Adequate flow of bolus through upper esophagus     Penetration Aspiration Scale: 2/8.  Material entered the airway, remained above the vocal cords and was ejected from the airway.    Overall Impression: The pharyngeal phase was slightly improved as evidenced by less amounts of penetration which did  not remain in the larynx post swallow.  No aspiration was observed.  Mild vallecular retention remained and was predominantly cleared with spontaneous second swallow.  Oral phase dysphagia has increased in severity as evidenced by delayed initiation of the oral swallow, significantly increasing oral transit times from 4-12 seconds per bolus.  Pt also c/o pain of 6/10 with the first 2 swallows.  She reports this pain is not typical.      Recommend general-soft, easy to chew foods and thin liquids.  Pt should continue lingual exercises and speech compensatory techniques learned in therapy.  Pt may benefit from speech therapy, however, patient is nearing the end of her Medicare allotment. Await formal diagnosis prior to initiating a course of speech therapy.    FCM category and level: Swallowing, 5  PLAN   Potential: Guarded    Diet Recommendations:  Solids: Regular to soft, easy to chew foods  Liquids: Thin    Recommended compensatory strategies:   Sit upright  Small sips  No straw    Medication Administration:  Take whole in pureed    Further Follow-up:  No follow up at this time.  Await formal neuro diagnosis.        EDUCATION/INSTRUCTION  Reviewed results and recommendations with patient and family.  Written instructions were provided.  Agreement/Understanding verbalized and all questions answered to their apparent satisfaction.      INTERDISCIPLINARY COMMUNICATION  Reviewed results with Radiologist; agreement verbalized.      Thank you for your referral.  If you have any questions, please contact me at [phone].    Little Bermudez MA/[facility]-SLP  Speech Language Pathologist  [facility]  [phone]    Electronically signed by therapist: Little Bermudez, SLP  Physician's certification required: No

## 2024-09-20 ENCOUNTER — ORDER TRANSCRIPTION (OUTPATIENT)
Dept: PHYSICAL THERAPY | Facility: HOSPITAL | Age: 74
End: 2024-09-20

## 2024-09-20 DIAGNOSIS — G12.21 ALS (AMYOTROPHIC LATERAL SCLEROSIS) (HCC): Primary | ICD-10-CM

## 2025-04-29 ENCOUNTER — APPOINTMENT (OUTPATIENT)
Dept: GENERAL RADIOLOGY | Age: 75
End: 2025-04-29
Attending: EMERGENCY MEDICINE

## 2025-04-29 ENCOUNTER — HOSPITAL ENCOUNTER (OUTPATIENT)
Age: 75
Setting detail: OBSERVATION
Discharge: HOME HEALTH | End: 2025-04-30
Attending: EMERGENCY MEDICINE | Admitting: INTERNAL MEDICINE

## 2025-04-29 ENCOUNTER — APPOINTMENT (OUTPATIENT)
Dept: CT IMAGING | Age: 75
End: 2025-04-29
Attending: EMERGENCY MEDICINE

## 2025-04-29 DIAGNOSIS — Z93.1 GASTROSTOMY TUBE IN PLACE (CMD): ICD-10-CM

## 2025-04-29 DIAGNOSIS — R11.10 VOMITING IN ADULT: Primary | ICD-10-CM

## 2025-04-29 DIAGNOSIS — G12.21 ALS (AMYOTROPHIC LATERAL SCLEROSIS)  (CMD): ICD-10-CM

## 2025-04-29 LAB
ALBUMIN SERPL-MCNC: 3.5 G/DL (ref 3.4–5)
ALBUMIN/GLOB SERPL: 0.9 {RATIO} (ref 1–2.4)
ALP SERPL-CCNC: 72 UNITS/L (ref 45–117)
ALT SERPL-CCNC: 28 UNITS/L
ANION GAP SERPL CALC-SCNC: 7 MMOL/L (ref 7–19)
AST SERPL-CCNC: 39 UNITS/L
BASOPHILS # BLD: 0 K/MCL (ref 0–0.3)
BASOPHILS NFR BLD: 1 %
BILIRUB SERPL-MCNC: 0.3 MG/DL (ref 0.2–1)
BUN SERPL-MCNC: 31 MG/DL (ref 6–20)
BUN/CREAT SERPL: 70 (ref 7–25)
CALCIUM SERPL-MCNC: 9.5 MG/DL (ref 8.4–10.2)
CHLORIDE SERPL-SCNC: 101 MMOL/L (ref 97–110)
CO2 SERPL-SCNC: 34 MMOL/L (ref 21–32)
CREAT SERPL-MCNC: 0.44 MG/DL (ref 0.51–0.95)
DEPRECATED RDW RBC: 44.1 FL (ref 39–50)
EGFRCR SERPLBLD CKD-EPI 2021: >90 ML/MIN/{1.73_M2}
EOSINOPHIL # BLD: 0.1 K/MCL (ref 0–0.5)
EOSINOPHIL NFR BLD: 2 %
ERYTHROCYTE [DISTWIDTH] IN BLOOD: 12.2 % (ref 11–15)
FASTING DURATION TIME PATIENT: ABNORMAL H
GLOBULIN SER-MCNC: 3.8 G/DL (ref 2–4)
GLUCOSE SERPL-MCNC: 127 MG/DL (ref 70–99)
HCT VFR BLD CALC: 39.7 % (ref 36–46.5)
HGB BLD-MCNC: 12.8 G/DL (ref 12–15.5)
IMM GRANULOCYTES # BLD AUTO: 0 K/MCL (ref 0–0.2)
IMM GRANULOCYTES # BLD: 0 %
LIPASE SERPL-CCNC: 30 UNITS/L (ref 15–77)
LYMPHOCYTES # BLD: 1.2 K/MCL (ref 1–4)
LYMPHOCYTES NFR BLD: 28 %
MCH RBC QN AUTO: 31.5 PG (ref 26–34)
MCHC RBC AUTO-ENTMCNC: 32.2 G/DL (ref 32–36.5)
MCV RBC AUTO: 97.8 FL (ref 78–100)
MONOCYTES # BLD: 0.3 K/MCL (ref 0.3–0.9)
MONOCYTES NFR BLD: 7 %
NEUTROPHILS # BLD: 2.7 K/MCL (ref 1.8–7.7)
NEUTROPHILS NFR BLD: 62 %
NRBC BLD MANUAL-RTO: 0 /100 WBC
PLATELET # BLD AUTO: 243 K/MCL (ref 140–450)
POTASSIUM SERPL-SCNC: 3.8 MMOL/L (ref 3.4–5.1)
PROT SERPL-MCNC: 7.3 G/DL (ref 6.4–8.2)
RBC # BLD: 4.06 MIL/MCL (ref 4–5.2)
SODIUM SERPL-SCNC: 138 MMOL/L (ref 135–145)
TROPONIN I SERPL DL<=0.01 NG/ML-MCNC: 5 NG/L
WBC # BLD: 4.3 K/MCL (ref 4.2–11)

## 2025-04-29 PROCEDURE — 96375 TX/PRO/DX INJ NEW DRUG ADDON: CPT

## 2025-04-29 PROCEDURE — 71045 X-RAY EXAM CHEST 1 VIEW: CPT

## 2025-04-29 PROCEDURE — 10002800 HB RX 250 W HCPCS: Performed by: EMERGENCY MEDICINE

## 2025-04-29 PROCEDURE — 93005 ELECTROCARDIOGRAM TRACING: CPT | Performed by: EMERGENCY MEDICINE

## 2025-04-29 PROCEDURE — 85025 COMPLETE CBC W/AUTO DIFF WBC: CPT | Performed by: EMERGENCY MEDICINE

## 2025-04-29 PROCEDURE — 83690 ASSAY OF LIPASE: CPT | Performed by: EMERGENCY MEDICINE

## 2025-04-29 PROCEDURE — 96374 THER/PROPH/DIAG INJ IV PUSH: CPT

## 2025-04-29 PROCEDURE — 36415 COLL VENOUS BLD VENIPUNCTURE: CPT | Performed by: EMERGENCY MEDICINE

## 2025-04-29 PROCEDURE — 74177 CT ABD & PELVIS W/CONTRAST: CPT

## 2025-04-29 PROCEDURE — 84484 ASSAY OF TROPONIN QUANT: CPT | Performed by: EMERGENCY MEDICINE

## 2025-04-29 PROCEDURE — G0378 HOSPITAL OBSERVATION PER HR: HCPCS

## 2025-04-29 PROCEDURE — 99285 EMERGENCY DEPT VISIT HI MDM: CPT

## 2025-04-29 PROCEDURE — 10002805 HB CONTRAST AGENT: Performed by: EMERGENCY MEDICINE

## 2025-04-29 PROCEDURE — 80053 COMPREHEN METABOLIC PANEL: CPT | Performed by: EMERGENCY MEDICINE

## 2025-04-29 RX ORDER — ONDANSETRON 2 MG/ML
4 INJECTION INTRAMUSCULAR; INTRAVENOUS ONCE
Status: COMPLETED | OUTPATIENT
Start: 2025-04-29 | End: 2025-04-29

## 2025-04-29 RX ORDER — METOCLOPRAMIDE HYDROCHLORIDE 5 MG/ML
10 INJECTION INTRAMUSCULAR; INTRAVENOUS ONCE
Status: COMPLETED | OUTPATIENT
Start: 2025-04-29 | End: 2025-04-29

## 2025-04-29 RX ORDER — DIPHENHYDRAMINE HYDROCHLORIDE 50 MG/ML
25 INJECTION, SOLUTION INTRAMUSCULAR; INTRAVENOUS ONCE
Status: COMPLETED | OUTPATIENT
Start: 2025-04-29 | End: 2025-04-29

## 2025-04-29 RX ORDER — MECLIZINE HYDROCHLORIDE 25 MG/1
25 TABLET ORAL ONCE
Status: DISCONTINUED | OUTPATIENT
Start: 2025-04-29 | End: 2025-04-30 | Stop reason: HOSPADM

## 2025-04-29 RX ADMIN — METOCLOPRAMIDE 10 MG: 5 INJECTION, SOLUTION INTRAMUSCULAR; INTRAVENOUS at 22:38

## 2025-04-29 RX ADMIN — ONDANSETRON 4 MG: 2 INJECTION INTRAMUSCULAR; INTRAVENOUS at 21:39

## 2025-04-29 RX ADMIN — IOHEXOL 75 ML: 350 INJECTION, SOLUTION INTRAVENOUS at 22:08

## 2025-04-29 RX ADMIN — DIPHENHYDRAMINE HYDROCHLORIDE 25 MG: 50 INJECTION INTRAMUSCULAR; INTRAVENOUS at 22:35

## 2025-04-30 ENCOUNTER — APPOINTMENT (OUTPATIENT)
Dept: MRI IMAGING | Age: 75
End: 2025-04-30
Attending: INTERNAL MEDICINE

## 2025-04-30 VITALS
TEMPERATURE: 98.1 F | RESPIRATION RATE: 16 BRPM | SYSTOLIC BLOOD PRESSURE: 147 MMHG | DIASTOLIC BLOOD PRESSURE: 66 MMHG | HEART RATE: 64 BPM | HEIGHT: 60 IN | WEIGHT: 105.38 LBS | OXYGEN SATURATION: 97 % | BODY MASS INDEX: 20.69 KG/M2

## 2025-04-30 LAB
ALBUMIN SERPL-MCNC: 3.2 G/DL (ref 3.4–5)
ALBUMIN/GLOB SERPL: 1 {RATIO} (ref 1–2.4)
ALP SERPL-CCNC: 67 UNITS/L (ref 45–117)
ALT SERPL-CCNC: 25 UNITS/L
ANION GAP SERPL CALC-SCNC: 8 MMOL/L (ref 7–19)
APPEARANCE UR: ABNORMAL
AST SERPL-CCNC: 31 UNITS/L
ATRIAL RATE (BPM): 74
BILIRUB SERPL-MCNC: 0.4 MG/DL (ref 0.2–1)
BILIRUB UR QL STRIP: NEGATIVE
BUN SERPL-MCNC: 24 MG/DL (ref 6–20)
BUN/CREAT SERPL: 52 (ref 7–25)
CALCIUM SERPL-MCNC: 9 MG/DL (ref 8.4–10.2)
CHLORIDE SERPL-SCNC: 103 MMOL/L (ref 97–110)
CO2 SERPL-SCNC: 30 MMOL/L (ref 21–32)
COLOR UR: YELLOW
CREAT SERPL-MCNC: 0.46 MG/DL (ref 0.51–0.95)
DEPRECATED RDW RBC: 44.9 FL (ref 39–50)
EGFRCR SERPLBLD CKD-EPI 2021: >90 ML/MIN/{1.73_M2}
ERYTHROCYTE [DISTWIDTH] IN BLOOD: 12.3 % (ref 11–15)
FASTING DURATION TIME PATIENT: ABNORMAL H
GLOBULIN SER-MCNC: 3.3 G/DL (ref 2–4)
GLUCOSE SERPL-MCNC: 102 MG/DL (ref 70–99)
GLUCOSE UR STRIP-MCNC: NEGATIVE MG/DL
HCT VFR BLD CALC: 35.3 % (ref 36–46.5)
HGB BLD-MCNC: 11.6 G/DL (ref 12–15.5)
HGB UR QL STRIP: NEGATIVE
KETONES UR STRIP-MCNC: NEGATIVE MG/DL
LEUKOCYTE ESTERASE UR QL STRIP: NEGATIVE
MCH RBC QN AUTO: 32.7 PG (ref 26–34)
MCHC RBC AUTO-ENTMCNC: 32.9 G/DL (ref 32–36.5)
MCV RBC AUTO: 99.4 FL (ref 78–100)
NITRITE UR QL STRIP: NEGATIVE
NRBC BLD MANUAL-RTO: 0 /100 WBC
P AXIS (DEGREES): 11
PH UR STRIP: 7.5 [PH] (ref 5–7)
PLATELET # BLD AUTO: 226 K/MCL (ref 140–450)
POTASSIUM SERPL-SCNC: 3.7 MMOL/L (ref 3.4–5.1)
PR-INTERVAL (MSEC): 160
PROT SERPL-MCNC: 6.5 G/DL (ref 6.4–8.2)
PROT UR STRIP-MCNC: ABNORMAL MG/DL
QRS-INTERVAL (MSEC): 80
QT-INTERVAL (MSEC): 364
QTC: 404
R AXIS (DEGREES): 48
RAINBOW EXTRA TUBES HOLD SPECIMEN: NORMAL
RBC # BLD: 3.55 MIL/MCL (ref 4–5.2)
REPORT TEXT: NORMAL
SODIUM SERPL-SCNC: 137 MMOL/L (ref 135–145)
SP GR UR STRIP: >1.03 (ref 1–1.03)
T AXIS (DEGREES): 40
UROBILINOGEN UR STRIP-MCNC: 0.2 MG/DL
VENTRICULAR RATE EKG/MIN (BPM): 74
WBC # BLD: 6.2 K/MCL (ref 4.2–11)

## 2025-04-30 PROCEDURE — G0378 HOSPITAL OBSERVATION PER HR: HCPCS

## 2025-04-30 PROCEDURE — A9585 GADOBUTROL INJECTION: HCPCS | Performed by: INTERNAL MEDICINE

## 2025-04-30 PROCEDURE — 81003 URINALYSIS AUTO W/O SCOPE: CPT | Performed by: INTERNAL MEDICINE

## 2025-04-30 PROCEDURE — 36415 COLL VENOUS BLD VENIPUNCTURE: CPT | Performed by: INTERNAL MEDICINE

## 2025-04-30 PROCEDURE — 96361 HYDRATE IV INFUSION ADD-ON: CPT

## 2025-04-30 PROCEDURE — 85027 COMPLETE CBC AUTOMATED: CPT | Performed by: INTERNAL MEDICINE

## 2025-04-30 PROCEDURE — 10002803 HB RX 637: Performed by: INTERNAL MEDICINE

## 2025-04-30 PROCEDURE — 80053 COMPREHEN METABOLIC PANEL: CPT | Performed by: INTERNAL MEDICINE

## 2025-04-30 PROCEDURE — 74183 MRI ABD W/O CNTR FLWD CNTR: CPT

## 2025-04-30 PROCEDURE — 10002807 HB RX 258: Performed by: INTERNAL MEDICINE

## 2025-04-30 PROCEDURE — 96375 TX/PRO/DX INJ NEW DRUG ADDON: CPT

## 2025-04-30 PROCEDURE — 10002800 HB RX 250 W HCPCS: Performed by: INTERNAL MEDICINE

## 2025-04-30 PROCEDURE — 10002805 HB CONTRAST AGENT: Performed by: INTERNAL MEDICINE

## 2025-04-30 PROCEDURE — 99204 OFFICE O/P NEW MOD 45 MIN: CPT | Performed by: INTERNAL MEDICINE

## 2025-04-30 RX ORDER — RILUZOLE 50 MG/1
50 TABLET, FILM COATED ORAL EVERY 12 HOURS
COMMUNITY

## 2025-04-30 RX ORDER — ONDANSETRON 2 MG/ML
4 INJECTION INTRAMUSCULAR; INTRAVENOUS EVERY 6 HOURS PRN
Status: DISCONTINUED | OUTPATIENT
Start: 2025-04-30 | End: 2025-04-30 | Stop reason: HOSPADM

## 2025-04-30 RX ORDER — LOSARTAN POTASSIUM 25 MG/1
25 TABLET ORAL DAILY
COMMUNITY

## 2025-04-30 RX ORDER — SCOPOLAMINE 1 MG/3D
1 PATCH, EXTENDED RELEASE TRANSDERMAL
COMMUNITY

## 2025-04-30 RX ORDER — PANTOPRAZOLE SODIUM 40 MG/10ML
40 INJECTION, POWDER, LYOPHILIZED, FOR SOLUTION INTRAVENOUS 2 TIMES DAILY
Status: DISCONTINUED | OUTPATIENT
Start: 2025-04-30 | End: 2025-04-30 | Stop reason: HOSPADM

## 2025-04-30 RX ORDER — METOCLOPRAMIDE HYDROCHLORIDE 5 MG/ML
5 INJECTION INTRAMUSCULAR; INTRAVENOUS EVERY 6 HOURS PRN
Status: DISCONTINUED | OUTPATIENT
Start: 2025-04-30 | End: 2025-04-30 | Stop reason: HOSPADM

## 2025-04-30 RX ORDER — GADOBUTROL 604.72 MG/ML
7.5 INJECTION INTRAVENOUS ONCE
Status: COMPLETED | OUTPATIENT
Start: 2025-04-30 | End: 2025-04-30

## 2025-04-30 RX ORDER — HYDROCHLOROTHIAZIDE 25 MG/1
25 TABLET ORAL DAILY
COMMUNITY

## 2025-04-30 RX ORDER — SODIUM CHLORIDE 9 MG/ML
INJECTION, SOLUTION INTRAVENOUS CONTINUOUS
Status: DISCONTINUED | OUTPATIENT
Start: 2025-04-30 | End: 2025-04-30 | Stop reason: HOSPADM

## 2025-04-30 RX ORDER — RILUZOLE 50 MG/1
50 TABLET, FILM COATED ORAL EVERY 12 HOURS SCHEDULED
Status: DISCONTINUED | OUTPATIENT
Start: 2025-04-30 | End: 2025-04-30 | Stop reason: HOSPADM

## 2025-04-30 RX ADMIN — PANTOPRAZOLE SODIUM 40 MG: 40 INJECTION, POWDER, FOR SOLUTION INTRAVENOUS at 08:50

## 2025-04-30 RX ADMIN — GADOBUTROL 7.5 ML: 604.72 INJECTION INTRAVENOUS at 11:09

## 2025-04-30 RX ADMIN — SODIUM CHLORIDE: 9 INJECTION, SOLUTION INTRAVENOUS at 01:59

## 2025-04-30 RX ADMIN — RILUZOLE 50 MG: 50 TABLET ORAL at 14:41

## 2025-04-30 SDOH — ECONOMIC STABILITY: HOUSING INSECURITY: WHAT IS YOUR LIVING SITUATION TODAY?: ALONE

## 2025-04-30 SDOH — SOCIAL STABILITY: SOCIAL NETWORK
HOW OFTEN DO YOU SEE OR TALK TO PEOPLE THAT YOU CARE ABOUT AND FEEL CLOSE TO? (FOR EXAMPLE: TALKING TO FRIENDS ON THE PHONE, VISITING FRIENDS OR FAMILY, GOING TO CHURCH OR CLUB MEETINGS): 5 OR MORE TIMES A WEEK

## 2025-04-30 SDOH — ECONOMIC STABILITY: INCOME INSECURITY: IN THE PAST 12 MONTHS, HAS THE ELECTRIC, GAS, OIL, OR WATER COMPANY THREATENED TO SHUT OFF SERVICE IN YOUR HOME?: NO

## 2025-04-30 SDOH — HEALTH STABILITY: PHYSICAL HEALTH: DO YOU HAVE SERIOUS DIFFICULTY WALKING OR CLIMBING STAIRS?: YES

## 2025-04-30 SDOH — SOCIAL STABILITY: SOCIAL INSECURITY: HOW OFTEN DOES ANYONE, INCLUDING FAMILY AND FRIENDS, PHYSICALLY HURT YOU?: NEVER

## 2025-04-30 SDOH — HEALTH STABILITY: GENERAL: BECAUSE OF A PHYSICAL, MENTAL, OR EMOTIONAL CONDITION, DO YOU HAVE DIFFICULTY DOING ERRANDS ALONE?: YES

## 2025-04-30 SDOH — SOCIAL STABILITY: SOCIAL INSECURITY: HOW OFTEN DOES ANYONE, INCLUDING FAMILY AND FRIENDS, SCREAM OR CURSE AT YOU?: NEVER

## 2025-04-30 SDOH — ECONOMIC STABILITY: FOOD INSECURITY: WITHIN THE PAST 12 MONTHS, THE FOOD YOU BOUGHT JUST DIDN'T LAST AND YOU DIDN'T HAVE MONEY TO GET MORE.: NEVER TRUE

## 2025-04-30 SDOH — HEALTH STABILITY: GENERAL
BECAUSE OF A PHYSICAL, MENTAL, OR EMOTIONAL CONDITION, DO YOU HAVE SERIOUS DIFFICULTY CONCENTRATING, REMEMBERING OR MAKING DECISIONS?: YES

## 2025-04-30 SDOH — ECONOMIC STABILITY: GENERAL

## 2025-04-30 SDOH — ECONOMIC STABILITY: HOUSING INSECURITY: WHAT IS YOUR LIVING SITUATION TODAY?: HOUSE

## 2025-04-30 SDOH — ECONOMIC STABILITY: HOUSING INSECURITY: DO YOU HAVE PROBLEMS WITH ANY OF THE FOLLOWING?: NONE OF THE ABOVE

## 2025-04-30 SDOH — ECONOMIC STABILITY: HOUSING INSECURITY: WHAT IS YOUR LIVING SITUATION TODAY?: I HAVE A STEADY PLACE TO LIVE

## 2025-04-30 SDOH — SOCIAL STABILITY: SOCIAL NETWORK: SUPPORT SYSTEMS: CHURCH/FAITH COMMUNITY

## 2025-04-30 SDOH — SOCIAL STABILITY: SOCIAL INSECURITY: HOW OFTEN DOES ANYONE, INCLUDING FAMILY AND FRIENDS, INSULT OR TALK DOWN TO YOU?: NEVER

## 2025-04-30 SDOH — HEALTH STABILITY: PHYSICAL HEALTH: DO YOU HAVE DIFFICULTY DRESSING OR BATHING?: YES

## 2025-04-30 SDOH — ECONOMIC STABILITY: TRANSPORTATION INSECURITY
IN THE PAST 12 MONTHS, HAS LACK OF RELIABLE TRANSPORTATION KEPT YOU FROM MEDICAL APPOINTMENTS, MEETINGS, WORK OR FROM GETTING THINGS NEEDED FOR DAILY LIVING?: NO

## 2025-04-30 SDOH — SOCIAL STABILITY: SOCIAL INSECURITY: HOW OFTEN DOES ANYONE, INCLUDING FAMILY AND FRIENDS, THREATEN YOU WITH HARM?: NEVER

## 2025-04-30 ASSESSMENT — ORIENTATION MEMORY CONCENTRATION TEST (OMCT)
SAY THE MONTHS IN REVERSE ORDER STARTING WITH LAST MONTH: CORRECT
REPEAT THE NAME AND ADDRESS I ASKED YOU TO REMEMBER: CORRECT
OMCT INTERPRETATION: 0-6: NO SIGNIFICANT IMPAIRMENT
WHAT MONTH IS IT NOW: CORRECT
WHAT TIME IS IT (NO WATCH OR CLOCK): CORRECT
OMCT SCORE: 0
WHAT YEAR IS IT NOW (MUST BE EXACT): CORRECT
COUNT BACKWARDS FROM 20 TO 1: CORRECT

## 2025-04-30 ASSESSMENT — PATIENT HEALTH QUESTIONNAIRE - PHQ9
1. LITTLE INTEREST OR PLEASURE IN DOING THINGS: SEVERAL DAYS
IS PATIENT ABLE TO COMPLETE PHQ2 OR PHQ9: YES
SUM OF ALL RESPONSES TO PHQ9 QUESTIONS 1 AND 2: 2
CLINICAL INTERPRETATION OF PHQ2 SCORE: NO FURTHER SCREENING NEEDED
SUM OF ALL RESPONSES TO PHQ9 QUESTIONS 1 AND 2: 2
2. FEELING DOWN, DEPRESSED OR HOPELESS: SEVERAL DAYS

## 2025-04-30 ASSESSMENT — ENCOUNTER SYMPTOMS
VOMITING: 1
NAUSEA: 1
ABDOMINAL DISTENTION: 1

## 2025-04-30 ASSESSMENT — ACTIVITIES OF DAILY LIVING (ADL)
ADL_BEFORE_ADMISSION: INDEPENDENT
DRESSING: INDEPENDENT
ADL_SHORT_OF_BREATH: NO
ADL_SCORE: 23
FEEDING: INDEPENDENT
BATHING: NEEDS ASSISTANCE
RECENT_DECLINE_ADL: NO
TOILETING: INDEPENDENT

## 2025-04-30 ASSESSMENT — COLUMBIA-SUICIDE SEVERITY RATING SCALE - C-SSRS
IS THE PATIENT ABLE TO COMPLETE C-SSRS: YES
1. WITHIN THE PAST MONTH, HAVE YOU WISHED YOU WERE DEAD OR WISHED YOU COULD GO TO SLEEP AND NOT WAKE UP?: NO
6. HAVE YOU EVER DONE ANYTHING, STARTED TO DO ANYTHING, OR PREPARED TO DO ANYTHING TO END YOUR LIFE?: NO
2. HAVE YOU ACTUALLY HAD ANY THOUGHTS OF KILLING YOURSELF?: NO

## 2025-04-30 ASSESSMENT — PAIN SCALES - GENERAL
PAINLEVEL_OUTOF10: 0

## 2025-04-30 ASSESSMENT — LIFESTYLE VARIABLES
HOW MANY STANDARD DRINKS CONTAINING ALCOHOL DO YOU HAVE ON A TYPICAL DAY: 0,1 OR 2
HOW OFTEN DO YOU HAVE 6 OR MORE DRINKS ON ONE OCCASION: NEVER
ALCOHOL_USE_STATUS: NO OR LOW RISK WITH VALIDATED TOOL
AUDIT-C TOTAL SCORE: 0
HOW OFTEN DO YOU HAVE A DRINK CONTAINING ALCOHOL: NEVER

## 2025-05-02 ENCOUNTER — TELEPHONE (OUTPATIENT)
Dept: CARE COORDINATION | Age: 75
End: 2025-05-02

## 2025-05-09 ENCOUNTER — TELEPHONE (OUTPATIENT)
Dept: CARE COORDINATION | Age: 75
End: 2025-05-09

## 2025-05-16 ENCOUNTER — TELEPHONE (OUTPATIENT)
Dept: CARE COORDINATION | Age: 75
End: 2025-05-16

## (undated) DEVICE — SPECIMEN SOCK - STANDARD: Brand: MEDI-VAC

## (undated) DEVICE — SET TB INFLO FOR TRUCLEAR SYS HYSTEROLUX

## (undated) DEVICE — ELITE HYSTEROSCOPE SEAL: Brand: TRUCLEAR

## (undated) DEVICE — SLEEVE COMPR MD KNEE LEN SGL USE KENDALL SCD

## (undated) DEVICE — SYRINGE MED 10ML LL CTRL W/ FNGR GRP CLR BRL

## (undated) DEVICE — NEEDLE SPNL 22GA L3.5IN BLK QNCKE STYL DISP

## (undated) DEVICE — MEDI-VAC NON-CONDUCTIVE SUCTION TUBING: Brand: CARDINAL HEALTH

## (undated) DEVICE — HYSTEROSCOPIC OUTFLOW TUBE SET

## (undated) DEVICE — PACK GYNE CUSTOM

## (undated) DEVICE — 2000CC GUARDIAN II: Brand: GUARDIAN

## (undated) DEVICE — SOLUTION IRRIG 3000ML 0.9% NACL FLX CONT

## (undated) DEVICE — SOFT TISSUE SHAVER MINI: Brand: TRUCLEAR

## (undated) DEVICE — GLOVE SUR 6 SENSICARE PI PIP CRM PWD F

## (undated) DEVICE — SOLUTION IRRIG 1000ML 0.9% NACL USP BTL

## (undated) NOTE — LETTER
Patient Name: Emilia Gaviria  YOB: 1950          MRN :  [id number]  Date:  6/13/2024  Referring Physician:  Trice Pack,   Emilia's dysarthria and dysphagia continue to worsen.  Please fax order for repeat VFSS to [phone].  Thank you.  BV     FINAL THERAPY SESSION AND DISCHARGE SUMMARY    Diagnosis: dysphagia (R13.10), speech disturbance (R47.9)  Authorized # of Visits:  6  recommended       Precautions: Covid PPE          Subjective: Pt intermittently coughs when she lays down and she then has trouble breathing. She feels like she has thick mucous in her throat. Her breathing sometimes improves after coughing or blowing her nose.  I she drinks too fast, she chokes.  Her coughing is getting worse. She avoids foods that contain a lot of liquid such as watermelon.  Pt is unable to use EMST now because she doesn't have enough strength.  She is doing the effortful swallows.  Tongue presses are getting more difficult.  Pt cancelled appointment with the general neurologist and went back to previous neurologist.  He suggested seeing a neurologist who specializes in neuromuscular diseases.   She went back to neurologist who recommends repeating EMG.    Pt was seen for a total of 10 therapy sessions.       2/9/24 VFSS:  Overall Impression: Mild oropharyngeal dysphagia was characterized by premature spillage to the pyriform sinuses with thin and mildly thick liquids, laryngeal penetration with thin liquids of which trace amounts remained in the laryngeal vestibule without reflexive response, and mild vallecular retention.  No definite aspiration was observed.  Recommend general diet with thin liquids in single drinks and dysphagia therapy.  Objective:      Date: 2/22/2024  Tx#: 1/4 Date: 3/5/24  Tx#: 2/4 Date: 3/19/24  Tx#: 3/4 Date: 4/10/24  Tx#: 4/4   EMST  Provided info on ordering  Calibrated starting point at [id number].  Trained and completed protocol.   Attempted to increase  difficulty, however, pat unable.  Pt requires holding cheeks to achieve lip closure.  Frequently air is not coursing through the device due to poor lip closure.   Effortful swallow  Trained and completed x25-30 with water and saliva. Reviewed.  Pt is completing at home.  Not completed in session due to time constraints. Reviewed, but not completed due to time constraints.   BOT/Lingual control & strength  Significant improvement in lingual elevation since OM assessment last session as patient has been practicing lingual elevation ex at home.    Trained and completed exercises x20 with frequent rest breaks. Completed 25 reps of each.  Pt needed frequent rest breaks.   Peak levels taken via IOPI:    IOPI lingual strength    MIPA: 11 kPa (max strength anterior tongue)    MIPP: 14 kPa (max strength posterior tongue)    posterior @ 8 kPa (80%) x15 and 7KPa (70%) x35    IOPI labial strength    MIPL: 14 kPa (max strength left lip)    MIPR: 21 kPa (max strength right lip)           Use of strategies  Reviewed.  Pt using chin tuck independently.     Tolerance of PO diet  Aggressive cough x1 No PO presented to day.  Pt reports little coughing at home.         4/11/24  Tx #: 5 4/18/24  6 4/25/24  7 5/3/24  8 5/16/24  9   EMST  Increased to 37.5 cmH2O  Completed protocol at new level.  Pt nose using nose clip and hand on cheeks to assist labial closure.  Pt still with some air loss around white mouth piece.  Not able to course air through with every blow, ~90% Increased to 33edV00.  Pt able to complete entire protocol at this more difficult setting.  Less air leakage around device.   Pt not able to advance difficulty level.  Remained at 84diL3P, but able to complete entire protocol without as much leakage from around the mouth piece.  The patient still benefits from hand against her cheeks to aid in labial closure around the mouthpiece.   Pt moved it to a more difficult level this week.  Re-calibrated.  Pt able to go up to  108.75 cmH2O, a significant increase.  Pt still benefits from tactile pressure on cheeks to aid in lip seal.    Effortful swallow X30 with water and saliva Pt is doing at home.  Reviewed. Effortful swallows completed between sets of EMST. X30 during session  with water and saliva.    Every  time pt drinks at home she uses effortful swallow. Pt is doing \"a lot\" at home.    Not completed during session due to time constraints.   BOT/Lingual control & strength IOPI lingual:  MIPA:  anterior tongue   11 kPa x20 reps  9 kPa x20 reps  10 kPa x10 reps    IOPI labial:  Left: 11 kPa x30, unable to achieve accuracy so reduced to 10 kPa x20 reps    Right: 15 kPa x50       IOPI lingual:  anterior tongue   9 kPa x25 reps  10 kPa x25 reps    Posterior tongue   8 kPa (80%) 2 sets x25     IOPI labial:  Left: 11 kPa x25    Right: 15 kPa x25    Less reps due to time constraints.    Improved lingual elevation IOPI lingual:  Anterior: 10 kPa x25  Posterior: 11 kPa x25        IOPI labial:  Left: 12 kPa x25  Right: 16 kPa x25   IOPI lingual  Anterior: 10kPa x35  Posterior: 11kPa x35      IOPI  Left: 13 kPa x25  Right: 17kPa x25 IOPI lingual  Anterior: 11kPa x50  Anterior: 12kPa x10  Posterior: 12kPa x50    IOPI labial  Left: 13 kPa x25  Right: 17kPa x25   Use of strategies Reviewed.  Pt consistent with use of chin tuck.   Pt is independent with chin tuck and small sips. Pt is independent with swallowing strategies.     Tolerance of PO diet No clinical signs of aspiration  No PO given this session. No clinical signs of aspiration. No PO presented today.   Speech strategies   Briefly discussed.  Cues required for speech strategies.   Intelligibility   Unknown context blinded 25%  Unknown context unblinded 67%        6/13/24  10   EMST Re-calibrated as patient reported inability to complete reps.  75 max, but unable to complete multiple reps.  Reduced to 67.5.    Completed only 1 set of 5 due to time constraints.   Effortful swallow  Completed with thickened liquids.   BOT/Lingual control & strength IOPI lingual strength re-assessment    MIPA: 11 kPa (max strength anterior tongue) same as initial assessment    MIPP: 12 kPa (max strength posterior tongue) reduced from initial assessment of 14kPa        IOPI labial strength    MIPL: 14 kPa (max strength left lip)  same as initial assessment    MIPR: 21 kPa (max strength right lip)  same as initial assessment   Use of strategies Pt using chin tuck. Introduced thickened liquids.  Pt felt it was easier  No clinical signs of aspiration.     Tolerance of PO diet No clinical signs of aspiration with thick liquids.   Speech strategies    Intelligibility 50% when face to face.        Assessment:  Reassessment of lingual and labial strength via IOPI revealed maintenance of strength from initial assessment, except for posterior tongue which diminished in strength.  During previous two sessions, however, Pt was able to increase targets and number of reps with IOPI lingual measures.   Expiratory muscle strength has been inconsistent, however, improvement has increased from initial assessment of 30 cmH2O to 75 cmH2O today.  Pt was unable to maintain the endurance required to complete the series of blows, however, and calibration was reduced to 67.5 cmH2O.  Pt reports coughing more frequently with liquids, therefore mildly thick liquids were introduced today.  Pt was able to drink 4 ounces without clinical signs of aspiration.  Speech intelligibility continues to slowly decline.  Pt with fasciculations of the tongue and loss of muscle bulk of the palms of hands. The patient does not have a neurological diagnosis, however, her disease process has been progressive and her dysarthria and dysphagia continue to worsen.  Pt was counseled to make appointment with a neurologist specializing in progressive neuromuscular diseases asap.      Plan: Discharge from therapy, but continue with  HEP x3/day.  Repeat VFSS is  recommended to assess current swallow physiology, assure tolerance of mildly thick liquids and determine appropriate compensatory strategies.           2/22/24 4/25/24  Non-verbal agility:  7/12 6/12  Verbal agility:   13/14 9/14        Goals: (to be met 6 visits)  The patient will tolerate general diet consistency and thin liquids without overt signs or symptoms of aspiration with 100 % accuracy.  Goal not met.  Downgraded this session to mildly thick liquids.  The patient/family/caregiver will demonstrate understanding and implementation of aspiration precautions and swallow strategies independently  Sit upright  Small sips  Use chin tuck for liquids Goal met.  Pt is using strategies consistently.  Patient will reduce risk of aspiration by completing dysphagia exercises to 90% accuracy.  Goal partially met.  Pt is completing exercises consistently but with minimal improvement.  The patient will participate in oral motor assessment Goal met.  The patient will improve articulatory precision to improve speech intelligibility to 95%. Goal not met.  The patient will improve lingual control, rate, coordination and strength to improve non-verbal agility to 90% and verbal agility to 100%. Goal not met.    Skilled Services: dysphagia therapy, speech therapy    Charges: [id number]     Total Treatment Time: 60 min    Little Bermudez MA/[facility]-SLP  Speech Language Pathologist  [facility]  [phone]           [email or web address] Cures Act Notice to Patient: Medical documents like this are made available to patients in the interest of transparency. However, be advised this is a medical document and it is intended as ddfm-dv-rlsc communication between your medical providers. This medical document may contain abbreviations, assessments, medical data, and results or other terms that are unfamiliar. Medical documents are intended to carry relevant information, facts as evident, and the clinical opinion of the practitioner.  As such, this medical document may be written in language that appears blunt or direct. You are encouraged to contact your medical provider and/or [facility] Patient Experience if you have any questions about this medical document.

## (undated) NOTE — LETTER
Patient Name: Emilia Gaviria  YOB: 1950          MRN number:  [id number]  Date:  2/22/2024  Referring Physician:  Trice Ann     Diagnosis: dysphagia (R13.10), speech disturbance (R47.9)  Authorized # of Visits:  6         Precautions: Covid PPE          Subjective: Pt feels her speech has gotten worse since her VFSS on 2/9/24.  The patient was accompanied by her .  This is patient's first therapy session.     2/9/24 VFSS:  Overall Impression: Mild oropharyngeal dysphagia was characterized by premature spillage to the pyriform sinuses with thin and mildly thick liquids, laryngeal penetration with thin liquids of which trace amounts remained in the laryngeal vestibule without reflexive response, and mild vallecular retention.  No definite aspiration was observed.  Recommend general diet with thin liquids in single drinks and dysphagia therapy.  Objective:      Date: 2/22/2024  Tx#: 1/4 Date:   Tx#: 2/4 Date:   Tx#: 3/4 Date:   Tx#: 4/4   EMST       Effortful swallow       BOT/Lingual control & strength       [place]       Use of strategies       Tolerance of PO diet           Assessment: Reviewed results and recommendations of VFSS.  Reviewed strategies.  Pt reports she has been using chin tuck with thin liquids.     Oral motor/CN exam and motor speech exam completed.  Results revealed possible involvement of CN V, IX, X and XII. Labial strength, ROM were WNL.  Lingual fasciculations were noted.  Lingual strength was mild-moderately reduced bilaterally.  Range of motion was minimally reduced with lateralization and elevation.  Pt was unable to elevate tongue to upper teeth when mouth was open.  Uvula was symmetrical at rest with mild erythema.  Soft palate retraction was impaired in that patient was only able to sustain for max of one second, even with sustained vowel production.  Resonance was significantly hypernasal with frequent nasal emission audible during spontaneous speech.  This  along with impaired articulatory precision, reduced the patient's intelligibility to ~80% in conversation.      Motor Speech assessment (BDAE):  Non-verbal agility: 7/12  Verbal agility: 13/14, however, articulatory breakdown occurred including imprecise consonants          Goals: (to be met 6 visits)  The patient will tolerate general diet consistency and thin liquids without overt signs or symptoms of aspiration with 100 % accuracy  The patient/family/caregiver will demonstrate understanding and implementation of aspiration precautions and swallow strategies independently  Sit upright  Small sips  Use chin tuck for liquids   Patient will reduce risk of aspiration by completing dysphagia exercises to 90% accuracy  The patient will participate in oral motor assessment Goal met.  The patient will improve articulatory precision to improve speech intelligibility to 95%.  The patient will improve lingual control, rate, coordination and strength to improve non-verbal agility to 90% and verbal agility to 100%.    Plan: Continue therapy per specified goals.  HEP x3/day.    Skilled Services: dysphagia therapy, speech therapy    Charges: [id number]     Total Treatment Time: 45 min    Little Bermudez MA/[facility]-SLP  Speech Language Pathologist  [facility]  [phone]          [employer] Cures Act Notice to Patient: Medical documents like this are made available to patients in the interest of transparency. However, be advised this is a medical document and it is intended as fwln-ex-udtz communication between your medical providers. This medical document may contain abbreviations, assessments, medical data, and results or other terms that are unfamiliar. Medical documents are intended to carry relevant information, facts as evident, and the clinical opinion of the practitioner. As such, this medical document may be written in language that appears blunt or direct. You are encouraged to contact your medical provider  and/or [facility] Patient Experience if you have any questions about this medical document.

## (undated) NOTE — LETTER
Patient Name: Emilia Gaviria  YOB: 1950          MRN number:  [id number]  Date:  2/9/2024  Referring Physician: Trice Ann       ADULT VIDEOFLUOROSCOPIC SWALLOWING STUDY       ADULT VIDEOFLUOROSCOPIC SWALLOWING STUDY:   Referring Physician: Seth      Radiologist: Dr. Wolfe  Diagnosis: dysphagia    Date of Service: 2/9/2024     PATIENT SUMMARY   Chief Complaint: In September the patient's speech suddenly became slow and slurred.  Neuro and ENT work ups, including MRIs, were negative. Laryngoscopy on 1/17/24 was normal. Currently, dysarthria persists and patient developed dysphagia with liquids in that she could not drink consecutively without coughing/choking.  She coughs with food and/or liquid about once per day.  She reported 8 pound weight loss in the past 4 months.  She denies odynophagia and shortness of breath.  She is scheduled for knee replacement surgery in early March.    Current Diet: regular foods and liquids    Problem List  Active Problems:  Active Problems:    * No active hospital problems. *      Past Medical History  Past Medical History:   Diagnosis Date    Arthritis     Combined forms of age-related cataract of left eye 2/29/2016    Elevated blood pressure reading without diagnosis of hypertension     Hemorrhoid     Leukopenia, unspecified type 7/2/2019    MENOPAUSE 12/06    Osteopenia of multiple sites 2/6/2017    OTHER DISEASES     Vitamin D deficiency 3/25/2010        Imaging results: no recent CXR    1/28/24 MRI maxillofacial:  1. No neck mass or cervical lymphadenopathy. The findings have not changed since the prior MRI of   the brain.     2. Suspect small meningioma in the right parasagittal parietal lobe.   12/5/23 MRI brain:  1. No acute infarct or intracranial mass.   2. Minimal supratentorial white matter chronic microvascular ischemic disease.   3. Small FLAIR hyperintense focus in the left lateral midbrain probably represents an additional   focus of chronic  microvascular ischemia.   ASSESSMENT   DYSPHAGIA ASSESSMENT  Test completed in conjunction with Radiologist.   Food/Liquid Types Presented: puree, solid, mildly thick liquids and thin liquids.    Study Position and View:  Patient was seated upright and viewed laterally and A-P.    Pain Assessment: The patient reports pain at a level of 2/10.    Oral phase:  Bilabial seal was intact with no anterior food or liquid loss.  Impaired lingual control and bolus containment resulted in premature spillage with thin and mildly thick liquids. Disordered A-P propulsion observed with the first presentation of puree which the patient indicated was because of the taste.  The patient propelled partial bolus into the pharynx, brought it back up into the oral cavity and then posterior into the pharynx again to achieve a complete swallow.   Mild oral residue with puree and solids was cleared with spontaneous second swallow.     Pharyngeal phase:  The pharyngeal response triggered at the pyriform sinuses for mildly thick and thin liquids due to premature spillage, and at the tongue base to valleculae for puree and solids.  Premature spillage and delayed epiglottic inversion resulted in laryngeal penetration with thin liquids.  Most of the penetration was ejected with the force of the swallow, however, occasionally trace remained in the laryngeal vestibule without reflexive response.  With consecutive drinks the amount and depth of penetration increased.  Penetration was less consistent with use of straw.  No definite aspiration was observed.  Reduced base of tongue retraction and reduced anterior hyoid excursion resulted in mild vallecular retention with liquids and minimal to mild amounts of vallecular retention with puree and solids.  Laryngeal elevation appeared WNL.    Esophageal phase:   Adequate flow of bolus through upper esophagus     Penetration Aspiration Scale: 3/8.  Material entered the airway, remained above the vocal  cords and was not ejected from the airway.    Overall Impression: Mild oropharyngeal dysphagia was characterized by premature spillage to the pyriform sinuses with thin and mildly thick liquids, laryngeal penetration with thin liquids of which trace amounts remained in the laryngeal vestibule without reflexive response, and mild vallecular retention.  No definite aspiration was observed.  Recommend general diet with thin liquids in single drinks and dysphagia therapy.    FCM category and level: Swallowing, 5  PLAN   Potential: Good    Diet Recommendations:  Solids: Regular  Liquids: Thin    Recommended compensatory strategies:   Sit upright  Small sips  Use chin tuck for liquids    Medication Administration:  Take whole in pureed    Further Follow-up:  Dysphagia therapy is recommended.      GOALS (to be met 6 visits)  The patient will tolerate general diet consistency and thin liquids without overt signs or symptoms of aspiration with 100 % accuracy  The patient/family/caregiver will demonstrate understanding and implementation of aspiration precautions and swallow strategies independently  Sit upright  Small sips  Use chin tuck for liquids   Patient will reduce risk of aspiration by completing dysphagia exercises to 90% accuracy  The patient will participate in oral motor assessment     EDUCATION/INSTRUCTION  Reviewed results and recommendations with patient and family.  Written instructions were provided.  Agreement/Understanding verbalized and all questions answered to their apparent satisfaction.      INTERDISCIPLINARY COMMUNICATION  Reviewed results with Radiologist; agreement verbalized.        Patient/Family was advised of these findings, precautions, recommendations and treatment options and has agreed to actively participate in planning and for this course of care.    Thank you for your referral. Please co-sign or sign and return this letter via fax as soon as possible. If you have any questions, please  contact me at [phone].    Little Bermudez MA/[facility]-SLP  Speech Language Pathologist  [facility]  [phone]    Electronically signed by therapist: Little Bermudez, SLP  Physician's certification required:   Yes  I certify the need for these services furnished under this plan of treatment and while under my care.    X___________________________________________________ Date____________________    Certification From: 2/9/2024  To:5/9/2024

## (undated) NOTE — LETTER
[address]  [address]  Authorization for Surgical Operation and Procedure     Date:___________                                                                                                         Time:__________  I hereby authorize Surgeon(s):  Elizabeth Alcaraz MD, my physician and his/her assistants (if applicable), which may include medical students, residents, and/or fellows, to perform the following surgical operation/ procedure and administer such anesthesia as may be determined necessary by my physician:  Operation/Procedure name (s) Procedure(s):  HYSTEROSCOPY, SAMPLING POLYPECTOMY on Emilia Gaviria   2.   I recognize that during the surgical operation/procedure, unforeseen conditions may necessitate additional or different procedures than those listed above.  I, therefore, further authorize and request that the above-named surgeon, assistants, or designees perform such procedures as are, in their judgment, necessary and desirable.    3.   My surgeon/physician has discussed prior to my surgery the potential benefits, risks and side effects of this procedure; the likelihood of achieving goals; and potential problems that might occur during recuperation.  They also discussed reasonable alternatives to the procedure, including risks, benefits, and side effects related to the alternatives and risks related to not receiving this procedure.  I have had all my questions answered and I acknowledge that no guarantee has been made as to the result that may be obtained.    4.   Should the need arise during my operation/procedure, which includes change of level of care prior to discharge, I also consent to the administration of blood and/or blood products.  Further, I understand that despite careful testing and screening of blood or blood products by collecting agencies, I may still be subject to ill effects as a result of receiving a blood transfusion and/or blood products.   The following are some, but not all, of the potential risks that can occur: fever and allergic reactions, hemolytic reactions, transmission of diseases such as Hepatitis, AIDS and Cytomegalovirus (CMV) and fluid overload.  In the event that I wish to have an autologous transfusion of my own blood, or a directed donor transfusion, I will discuss this with my physician.  Check only if Refusing Blood or Blood Products  I understand refusal of blood or blood products as deemed necessary by my physician may have serious consequences to my condition to include possible death. I hereby assume responsibility for my refusal and release the hospital, its personnel, and my physicians from any responsibility for the consequences of my refusal.          o  Refuse      5.   I authorize the use of any specimen, organs, tissues, body parts or foreign objects that may be removed from my body during the operation/procedure for diagnosis, research or teaching purposes and their subsequent disposal by hospital authorities.  I also authorize the release of specimen test results and/or written reports to my treating physician on the hospital medical staff or other referring or consulting physicians involved in my care, at the discretion of the Pathologist or my treating physician.    6.   I consent to the photographing or videotaping of the operations or procedures to be performed, including appropriate portions of my body for medical, scientific, or educational purposes, provided my identity is not revealed by the pictures or by descriptive texts accompanying them.  If the procedure has been photographed/videotaped, the surgeon will obtain the original picture, image, videotape or CD.  The hospital will not be responsible for storage, release or maintenance of the picture, image, tape or CD.    7.   I consent to the presence of a  or observers in the operating room as deemed necessary by my physician or their  designees.    8.   I recognize that in the event my procedure results in extended X-Ray/fluoroscopy time, I may develop a skin reaction.    9. If I have a Do Not Attempt Resuscitation (DNAR) order in place, that status will be suspended while in the operating room, procedural suite, and during the recovery period unless otherwise explicitly stated by me (or a person authorized to consent on my behalf). The surgeon or my attending physician will determine when the applicable recovery period ends for purposes of reinstating the DNAR order.  10. Patients having a sterilization procedure: I understand that if the procedure is successful the results will be permanent and it will therefore be impossible for me to inseminate, conceive, or bear children.  I also understand that the procedure is intended to result in sterility, although the result has not been guaranteed.   11. I acknowledge that my physician has explained sedation/analgesia administration to me including the risk and benefits I consent to the administration of sedation/analgesia as may be necessary or desirable in the judgment of my physician.    I CERTIFY THAT I HAVE READ AND FULLY UNDERSTAND THE ABOVE CONSENT TO OPERATION and/or OTHER PROCEDURE.    _________________________________________  __________________________________  Signature of Patient     Signature of Responsible Person         ___________________________________         Printed Name of Responsible Person           _________________________________                 Relationship to Patient  _________________________________________  ______________________________  Signature of Witness          Date  Time      Patient Name: Emilia Gaviria     : 11/10/1950                 Printed: 2024     Medical Record #: [id number]                     Page [address] of [address] [place] [address]  [id number]    Consent for Anesthesia    Emilia MATTSON  [place] agree to be cared for by an anesthesiologist, who is specially trained to monitor me and give me medicine to put me to sleep or keep me comfortable during my procedure    I understand that my anesthesiologist is not an employee or agent of [facility] or [employer]. He or she works for [employer]    As the patient asking for anesthesia services, I agree to:  Allow the anesthesiologist (anesthesia doctor) to give me medicine and do additional procedures as necessary. Some examples are: Starting or using an “IV” to give me medicine, fluids or blood during my procedure, and having a breathing tube placed to help me breathe when I’m asleep (intubation). In the event that my heart stops working properly, I understand that my anesthesiologist will make every effort to sustain my life, unless otherwise directed by [facility] Do Not Resuscitate documents.  Tell my anesthesia doctor before my procedure:  If I am pregnant.  The last time that I ate or drank.  All of the medicines I take (including prescriptions, herbal supplements, and pills I can buy without a prescription (including street drugs/illegal medications). Failure to inform my anesthesiologist about these medicines may increase my risk of anesthetic complications.  If I am allergic to anything or have had a reaction to anesthesia before.  I understand how the anesthesia medicine will help me (benefits).  I understand that with any type of anesthesia medicine there are risks:  The most common risks are: nausea, vomiting, sore throat, muscle soreness, damage to my eyes, mouth, or teeth (from breathing tube placement).  Rare risks include: remembering what happened during my procedure, allergic reactions to medications, injury to my airway, heart, lungs, vision, nerves, or muscles and in extremely rare instances death.  My doctor has explained to me other choices available to me for my care (alternatives).  Pregnant  Patients (“epidural”):  I understand that the risks of having an epidural (medicine given into my back to help control pain during labor), include itching, low blood pressure, difficulty urinating, headache or slowing of the baby’s heart. Very rare risks include infection, bleeding, seizure, irregular heart rhythms and nerve injury.  Regional Anesthesia (“spinal”, “epidural”, & “nerve blocks”):  I understand that rare but potential complications include headache, bleeding, infection, seizure, irregular heart rhythms, and nerve injury.    I can change my mind about having anesthesia services at any time before I get the medicine.    _____________________________________________________________________________  Patient (or Representative) Signature/Relationship to Patient  Date   Time    _____________________________________________________________________________   Name (if used)    Language/Organization   Time    _____________________________________________________________________________  Anesthesiologist Signature     Date   Time  I have discussed the procedure and information above with the patient (or patient’s representative) and answered their questions. The patient or their representative has agreed to have anesthesia services.    _____________________________________________________________________________  Witness        Date   Time  I have verified that the signature is that of the patient or patient’s representative, and that it was signed before the procedure  Patient Name: Emilia Gaviria     : 11/10/1950                 Printed: 2024     Medical Record #: [id number]                     Page 2 of 2